# Patient Record
Sex: MALE | Race: WHITE | NOT HISPANIC OR LATINO | Employment: OTHER | URBAN - METROPOLITAN AREA
[De-identification: names, ages, dates, MRNs, and addresses within clinical notes are randomized per-mention and may not be internally consistent; named-entity substitution may affect disease eponyms.]

---

## 2017-01-30 ENCOUNTER — AMBULATORY - HEALTHEAST (OUTPATIENT)
Dept: LAB | Facility: CLINIC | Age: 57
End: 2017-01-30

## 2017-01-30 DIAGNOSIS — R97.20 ELEVATED PSA: ICD-10-CM

## 2017-01-30 DIAGNOSIS — E78.5 HYPERLIPIDEMIA: ICD-10-CM

## 2017-01-30 LAB
CHOLEST SERPL-MCNC: 292 MG/DL
FASTING STATUS PATIENT QL REPORTED: YES
HDLC SERPL-MCNC: 38 MG/DL
LDLC SERPL CALC-MCNC: 192 MG/DL
PSA SERPL-MCNC: 5.1 NG/ML (ref 0–3.5)
TRIGL SERPL-MCNC: 312 MG/DL

## 2017-01-31 ENCOUNTER — COMMUNICATION - HEALTHEAST (OUTPATIENT)
Dept: FAMILY MEDICINE | Facility: CLINIC | Age: 57
End: 2017-01-31

## 2017-03-06 ENCOUNTER — COMMUNICATION - HEALTHEAST (OUTPATIENT)
Dept: FAMILY MEDICINE | Facility: CLINIC | Age: 57
End: 2017-03-06

## 2017-03-06 ENCOUNTER — AMBULATORY - HEALTHEAST (OUTPATIENT)
Dept: FAMILY MEDICINE | Facility: CLINIC | Age: 57
End: 2017-03-06

## 2017-11-30 ENCOUNTER — COMMUNICATION - HEALTHEAST (OUTPATIENT)
Dept: FAMILY MEDICINE | Facility: CLINIC | Age: 57
End: 2017-11-30

## 2017-11-30 DIAGNOSIS — E78.00 HYPERCHOLESTEROLEMIA: ICD-10-CM

## 2017-11-30 DIAGNOSIS — R97.20 ELEVATED PSA: ICD-10-CM

## 2017-12-04 ENCOUNTER — COMMUNICATION - HEALTHEAST (OUTPATIENT)
Dept: INTERNAL MEDICINE | Facility: CLINIC | Age: 57
End: 2017-12-04

## 2017-12-04 ENCOUNTER — AMBULATORY - HEALTHEAST (OUTPATIENT)
Dept: LAB | Facility: CLINIC | Age: 57
End: 2017-12-04

## 2017-12-04 DIAGNOSIS — E78.00 HYPERCHOLESTEROLEMIA: ICD-10-CM

## 2017-12-04 DIAGNOSIS — R97.20 ELEVATED PSA: ICD-10-CM

## 2017-12-04 LAB
CHOLEST SERPL-MCNC: 253 MG/DL
FASTING STATUS PATIENT QL REPORTED: YES
HDLC SERPL-MCNC: 38 MG/DL
LDLC SERPL CALC-MCNC: 177 MG/DL
PSA SERPL-MCNC: 5.5 NG/ML (ref 0–3.5)
TRIGL SERPL-MCNC: 189 MG/DL

## 2017-12-08 ENCOUNTER — OFFICE VISIT - HEALTHEAST (OUTPATIENT)
Dept: INTERNAL MEDICINE | Facility: CLINIC | Age: 57
End: 2017-12-08

## 2017-12-08 DIAGNOSIS — Z78.9 PATIENT TRAVELS: ICD-10-CM

## 2017-12-08 DIAGNOSIS — R97.20 ELEVATED PSA: ICD-10-CM

## 2017-12-08 DIAGNOSIS — Z85.828 HISTORY OF SKIN CANCER: ICD-10-CM

## 2017-12-08 DIAGNOSIS — E78.00 HYPERCHOLESTEROLEMIA: ICD-10-CM

## 2017-12-08 DIAGNOSIS — Z12.11 SCREEN FOR COLON CANCER: ICD-10-CM

## 2017-12-08 ASSESSMENT — MIFFLIN-ST. JEOR: SCORE: 1700.09

## 2018-02-13 ENCOUNTER — RECORDS - HEALTHEAST (OUTPATIENT)
Dept: ADMINISTRATIVE | Facility: OTHER | Age: 58
End: 2018-02-13

## 2018-05-17 ENCOUNTER — OFFICE VISIT - HEALTHEAST (OUTPATIENT)
Dept: FAMILY MEDICINE | Facility: CLINIC | Age: 58
End: 2018-05-17

## 2018-05-17 ENCOUNTER — COMMUNICATION - HEALTHEAST (OUTPATIENT)
Dept: SCHEDULING | Facility: CLINIC | Age: 58
End: 2018-05-17
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
## 2018-05-18 LAB — B BURGDOR IGG+IGM SER QL: 0.17 INDEX VALUE

## 2018-05-23 ENCOUNTER — COMMUNICATION - HEALTHEAST (OUTPATIENT)
Dept: FAMILY MEDICINE | Facility: CLINIC | Age: 58
End: 2018-05-23

## 2018-05-28 ENCOUNTER — OFFICE VISIT - HEALTHEAST (OUTPATIENT)
Dept: FAMILY MEDICINE | Facility: CLINIC | Age: 58
End: 2018-05-28

## 2018-05-28 DIAGNOSIS — H11.31 SUBCONJUNCTIVAL HEMORRHAGE, RIGHT: ICD-10-CM

## 2019-02-05 ENCOUNTER — OFFICE VISIT - HEALTHEAST (OUTPATIENT)
Dept: FAMILY MEDICINE | Facility: CLINIC | Age: 59
End: 2019-02-05

## 2019-02-05 DIAGNOSIS — G89.29 CHRONIC LOW BACK PAIN WITH SCIATICA, SCIATICA LATERALITY UNSPECIFIED, UNSPECIFIED BACK PAIN LATERALITY: ICD-10-CM

## 2019-02-05 DIAGNOSIS — E78.49 OTHER HYPERLIPIDEMIA: ICD-10-CM

## 2019-02-05 DIAGNOSIS — M54.40 CHRONIC LOW BACK PAIN WITH SCIATICA, SCIATICA LATERALITY UNSPECIFIED, UNSPECIFIED BACK PAIN LATERALITY: ICD-10-CM

## 2019-02-05 DIAGNOSIS — N40.1 BENIGN PROSTATIC HYPERPLASIA WITH NOCTURIA: ICD-10-CM

## 2019-02-05 DIAGNOSIS — G47.33 OBSTRUCTIVE SLEEP APNEA (ADULT) (PEDIATRIC): ICD-10-CM

## 2019-02-05 DIAGNOSIS — Z00.00 PHYSICAL EXAM: ICD-10-CM

## 2019-02-05 DIAGNOSIS — D12.6 BENIGN NEOPLASM OF COLON, UNSPECIFIED PART OF COLON: ICD-10-CM

## 2019-02-05 DIAGNOSIS — R97.20 ELEVATED PSA: ICD-10-CM

## 2019-02-05 DIAGNOSIS — C44.309 MALIGNANT NEOPLASM OF SKIN OF PARTS OF FACE: ICD-10-CM

## 2019-02-05 DIAGNOSIS — R35.1 BENIGN PROSTATIC HYPERPLASIA WITH NOCTURIA: ICD-10-CM

## 2019-02-05 ASSESSMENT — MIFFLIN-ST. JEOR: SCORE: 1680.36

## 2019-02-22 ENCOUNTER — AMBULATORY - HEALTHEAST (OUTPATIENT)
Dept: LAB | Facility: CLINIC | Age: 59
End: 2019-02-22

## 2019-02-22 ENCOUNTER — COMMUNICATION - HEALTHEAST (OUTPATIENT)
Dept: PEDIATRICS | Facility: CLINIC | Age: 59
End: 2019-02-22

## 2019-02-22 DIAGNOSIS — R97.20 ELEVATED PSA: ICD-10-CM

## 2019-02-22 DIAGNOSIS — E78.49 OTHER HYPERLIPIDEMIA: ICD-10-CM

## 2019-02-22 LAB
ALBUMIN SERPL-MCNC: 4.1 G/DL (ref 3.5–5)
ALP SERPL-CCNC: 75 U/L (ref 45–120)
ALT SERPL W P-5'-P-CCNC: 27 U/L (ref 0–45)
ANION GAP SERPL CALCULATED.3IONS-SCNC: 9 MMOL/L (ref 5–18)
AST SERPL W P-5'-P-CCNC: 23 U/L (ref 0–40)
BILIRUB SERPL-MCNC: 0.6 MG/DL (ref 0–1)
BUN SERPL-MCNC: 9 MG/DL (ref 8–22)
CALCIUM SERPL-MCNC: 9.5 MG/DL (ref 8.5–10.5)
CHLORIDE BLD-SCNC: 107 MMOL/L (ref 98–107)
CHOLEST SERPL-MCNC: 231 MG/DL
CO2 SERPL-SCNC: 26 MMOL/L (ref 22–31)
CREAT SERPL-MCNC: 1.02 MG/DL (ref 0.7–1.3)
FASTING STATUS PATIENT QL REPORTED: YES
GFR SERPL CREATININE-BSD FRML MDRD: >60 ML/MIN/1.73M2
GLUCOSE BLD-MCNC: 94 MG/DL (ref 70–125)
HDLC SERPL-MCNC: 43 MG/DL
LDLC SERPL CALC-MCNC: 142 MG/DL
POTASSIUM BLD-SCNC: 4.6 MMOL/L (ref 3.5–5)
PROT SERPL-MCNC: 7 G/DL (ref 6–8)
PSA SERPL-MCNC: 4.9 NG/ML (ref 0–3.5)
SODIUM SERPL-SCNC: 142 MMOL/L (ref 136–145)
TRIGL SERPL-MCNC: 231 MG/DL

## 2019-05-08 ENCOUNTER — OFFICE VISIT - HEALTHEAST (OUTPATIENT)
Dept: INTERNAL MEDICINE | Facility: CLINIC | Age: 59
End: 2019-05-08

## 2019-05-08 DIAGNOSIS — G47.30 SLEEP APNEA, UNSPECIFIED TYPE: ICD-10-CM

## 2019-05-08 DIAGNOSIS — R53.83 FATIGUE, UNSPECIFIED TYPE: ICD-10-CM

## 2019-10-11 ENCOUNTER — OFFICE VISIT - HEALTHEAST (OUTPATIENT)
Dept: SLEEP MEDICINE | Facility: CLINIC | Age: 59
End: 2019-10-11

## 2019-10-11 DIAGNOSIS — G47.10 EXCESSIVE SLEEPINESS: ICD-10-CM

## 2019-10-11 DIAGNOSIS — G47.33 OBSTRUCTIVE SLEEP APNEA (ADULT) (PEDIATRIC): ICD-10-CM

## 2019-10-11 RX ORDER — LORATADINE 10 MG/1
10 TABLET ORAL DAILY
Status: SHIPPED | COMMUNITY
Start: 2019-10-11 | End: 2022-05-25

## 2019-10-11 ASSESSMENT — MIFFLIN-ST. JEOR: SCORE: 1708.94

## 2019-12-05 ENCOUNTER — COMMUNICATION - HEALTHEAST (OUTPATIENT)
Dept: TELEHEALTH | Facility: CLINIC | Age: 59
End: 2019-12-05

## 2019-12-05 ENCOUNTER — OFFICE VISIT - HEALTHEAST (OUTPATIENT)
Dept: FAMILY MEDICINE | Facility: CLINIC | Age: 59
End: 2019-12-05

## 2019-12-05 DIAGNOSIS — R05.9 COUGH: ICD-10-CM

## 2019-12-05 RX ORDER — ALBUTEROL SULFATE 90 UG/1
2 AEROSOL, METERED RESPIRATORY (INHALATION) EVERY 6 HOURS PRN
Status: SHIPPED | COMMUNITY
Start: 2019-12-05 | End: 2022-03-29

## 2019-12-05 ASSESSMENT — MIFFLIN-ST. JEOR: SCORE: 1679.91

## 2020-04-15 ENCOUNTER — OFFICE VISIT - HEALTHEAST (OUTPATIENT)
Dept: FAMILY MEDICINE | Facility: CLINIC | Age: 60
End: 2020-04-15

## 2020-04-15 DIAGNOSIS — R35.1 NOCTURIA: ICD-10-CM

## 2020-05-11 ENCOUNTER — COMMUNICATION - HEALTHEAST (OUTPATIENT)
Dept: FAMILY MEDICINE | Facility: CLINIC | Age: 60
End: 2020-05-11

## 2020-05-11 DIAGNOSIS — R35.1 NOCTURIA: ICD-10-CM

## 2020-06-09 ENCOUNTER — COMMUNICATION - HEALTHEAST (OUTPATIENT)
Dept: FAMILY MEDICINE | Facility: CLINIC | Age: 60
End: 2020-06-09

## 2020-06-09 DIAGNOSIS — R35.1 NOCTURIA: ICD-10-CM

## 2020-07-11 ENCOUNTER — COMMUNICATION - HEALTHEAST (OUTPATIENT)
Dept: FAMILY MEDICINE | Facility: CLINIC | Age: 60
End: 2020-07-11

## 2020-07-11 DIAGNOSIS — R35.1 NOCTURIA: ICD-10-CM

## 2020-07-19 ENCOUNTER — VIRTUAL VISIT (OUTPATIENT)
Dept: FAMILY MEDICINE | Facility: OTHER | Age: 60
End: 2020-07-19

## 2020-07-20 ENCOUNTER — COMMUNICATION - HEALTHEAST (OUTPATIENT)
Dept: SCHEDULING | Facility: CLINIC | Age: 60
End: 2020-07-20

## 2020-07-20 ENCOUNTER — AMBULATORY - HEALTHEAST (OUTPATIENT)
Dept: FAMILY MEDICINE | Facility: CLINIC | Age: 60
End: 2020-07-20

## 2020-07-20 ENCOUNTER — OFFICE VISIT - HEALTHEAST (OUTPATIENT)
Dept: FAMILY MEDICINE | Facility: CLINIC | Age: 60
End: 2020-07-20
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
## 2020-07-20 DIAGNOSIS — Z20.822 SUSPECTED COVID-19 VIRUS INFECTION: ICD-10-CM

## 2020-07-20 NOTE — PROGRESS NOTES
"Date: 2020 19:13:57  Clinician: Donald Amezquita  Clinician NPI: [id number]  Patient: Jermaine Hoyos  Patient : 1960  Patient Address: [address] [place] [address]  Patient Phone: [phone]  Visit Protocol: URI  Patient Summary:  Jermaine is a 60 year old ( : 1960 ) male who initiated a Visit for COVID-19 (Coronavirus) evaluation and screening. When asked the question \"Please sign me up to receive news, health information and promotions. \", Jermaine responded \"No\".    Jermaine states his symptoms started 1-2 days ago.   His symptoms consist of tooth pain, malaise, a headache, chills, a sore throat, and myalgia. Jermaine also feels feverish.   Symptom details     Sore throat: Jermaine reports having mild throat pain (1-3 on a 10 point pain scale), does not have exudate on his tonsils, and can swallow liquids. He is not sure if the lymph nodes in his neck are enlarged. A rash has not appeared on the skin since the sore throat started.     Temperature: His current temperature is 101.0 degrees Fahrenheit. Jermaine has had a temperature over 100 degrees Fahrenheit for 1-2 days.     Headache: He states the headache is moderate (4-6 on a 10 point pain scale).     Tooth pain: The tooth pain is not caused by a cavity, recent dental work, or other mouth problems.      Jermaine denies having wheezing, nausea, ageusia, diarrhea, vomiting, rhinitis, ear pain, anosmia, facial pain or pressure, cough, and nasal congestion. He also denies having recent facial or sinus surgery in the past 60 days and taking antibiotic medication in the past month. He is not experiencing dyspnea.   Precipitating events  Within the past week, Jermaine has not been exposed to someone with strep throat. He has not recently been exposed to someone with influenza. Jermaine has not been in close contact with any high risk individuals.   Pertinent COVID-19 (Coronavirus) information  In the past 14 days, Jermaine has not " worked in a congregate living setting.   He does not work or volunteer as healthcare worker or a  and does not work or volunteer in a healthcare facility.   Jermaine also has not lived in a congregate living setting in the past 14 days. He lives with a healthcare worker.   Jermaine has not had a close contact with a laboratory-confirmed COVID-19 patient within 14 days of symptom onset.   Pertinent medical history  Jermaine had 2 sinus infections within the past year.   Jermaine needs a return to work/school note.   Weight: 183 lbs   Jermaine does not smoke or use smokeless tobacco.   Additional information as reported by the patient (free text): Aches started sat 18 July headache started sat 2 pm. Fever started sat July 18 2 pm 99.5. 100 to 100.5 thru nite. 101.5 Sunday derrick.   Weight: 183 lbs    MEDICATIONS: Lipitor oral, Crestor oral, Aleve oral, ALLERGIES: Lipitor, Crestor, Aleve  Clinician Response:  Dear Jermaine,   Your symptoms show that you may have coronavirus (COVID-19). This illness can cause fever, cough and trouble breathing. Many people get a mild case and get better on their own. Some people can get very sick.  What should I do?  We would like to test you for this virus.   1. Please call [phone] to schedule your visit. Explain that you were referred by [facility] to have a COVID-19 test. Be ready to share your [facility] visit ID number.  The following will serve as your written order for this COVID Test, ordered by me, for the indication of suspected COVID [Z20.828]: The test will be ordered in [employer], our electronic health record, after you are scheduled. It will show as ordered and authorized by Stevie Alexis MD.  Order: COVID-19 (Coronavirus) PCR for SYMPTOMATIC testing from [facility].      2. When it's time for your COVID test:  Stay at least 6 feet away from others. (If someone will drive you to your test, stay in the backseat, as far away from the  as you can.)   Cover your mouth and nose  "with a mask, tissue or washcloth.  Go straight to the testing site. Don't make any stops on the way there or back.      3.Starting now: Stay home and away from others (self-isolate) until:   You've had no fever---and no medicine that reduces fever---for 3 full days (72 hours). And...   Your other symptoms have gotten better. For example, your cough or breathing has improved. And...   At least 10 days have passed since your symptoms started.       During this time, don't leave the house except for testing or medical care.   Stay in your own room, even for meals. Use your own bathroom if you can.   Stay away from others in your home. No hugging, kissing or shaking hands. No visitors.  Don't go to work, school or anywhere else.    Clean \"high touch\" surfaces often (doorknobs, counters, handles, etc.). Use a household cleaning spray or wipes. You'll find a full list of  on the EPA website: www.epa.gov/pesticide-registration/list-n-disinfectants-use-against-sars-cov-2.   Cover your mouth and nose with a mask, tissue or washcloth to avoid spreading germs.  Wash your hands and face often. Use soap and water.  Caregivers in these groups are at risk for severe illness due to COVID-19:  o People 65 years and older  o People who live in a nursing home or long-term care facility  o People with chronic disease (lung, heart, cancer, diabetes, kidney, liver, immunologic)  o People who have a weakened immune system, including those who:   Are in cancer treatment  Take medicine that weakens the immune system, such as corticosteroids  Had a bone marrow or organ transplant  Have an immune deficiency  Have poorly controlled HIV or AIDS  Are obese (body mass index of 40 or higher)  Smoke regularly   o Caregivers should wear gloves while washing dishes, handling laundry and cleaning bedrooms and bathrooms.  o Use caution when washing and drying laundry: Don't shake dirty laundry, and use the warmest water setting that you can.  o " For more tips, go to www.cdc.gov/coronavirus/2019-ncov/downloads/10Things.pdf.    4.Sign up for [employer]. We know it's scary to hear that you might have COVID-19. We want to track your symptoms to make sure you're okay over the next 2 weeks. Please look for an email from [employer]---this is a free, online program that we'll use to keep in touch. To sign up, follow the link in the email. Learn more at [email or web address]  How can I take care of myself?   Get lots of rest. Drink extra fluids (unless a doctor has told you not to).   Take Tylenol (acetaminophen) for fever or pain. If you have liver or kidney problems, ask your family doctor if it's okay to take Tylenol.   Adults can take either:    650 mg (two 325 mg pills) every 4 to 6 hours, or...   1,000 mg (two 500 mg pills) every 8 hours as needed.    Note: Don't take more than 3,000 mg in one day. Acetaminophen is found in many medicines (both prescribed and over-the-counter medicines). Read all labels to be sure you don't take too much.   For children, check the Tylenol bottle for the right dose. The dose is based on the child's age or weight.    If you have other health problems (like cancer, heart failure, an organ transplant or severe kidney disease): Call your specialty clinic if you don't feel better in the next 2 days.       Know when to call 911. Emergency warning signs include:    Trouble breathing or shortness of breath Pain or pressure in the chest that doesn't go away Feeling confused like you haven't felt before, or not being able to wake up Bluish-colored lips or face.  Where can I get more information?    [facility] -- About COVID-19: [email or web address]   CDC -- What to Do If You're Sick: www.cdc.gov/coronavirus/2019-ncov/about/steps-when-sick.html   CDC -- Ending Home Isolation: www.cdc.gov/coronavirus/2019-ncov/hcp/disposition-in-home-patients.html   CDC -- Caring for Someone:  www.cdc.gov/coronavirus/2019-ncov/if-you-are-sick/care-for-someone.html   [facility] -- Interim Guidance for Hospital Discharge to Home: www.health.[facility].[place]/diseases/coronavirus/hcp/hospdischarge.pdf   [facility] clinical trials (COVID-19 research studies): clinicalaffairs.[facility]-clinical-trials    Below are the COVID-19 hotlines at the [place] Department of Health ([facility]). Interpreters are available.    For health questions: Call 885-634-7190 or 1-852.540.7655 (7 a.m. to 7 p.m.) For questions about schools and childcare: Call 119-150-4603 or 1-440.718.4287 (7 a.m. to 7 p.m.)    Diagnosis: Other malaise  Diagnosis ICD: R53.81

## 2020-07-21 ENCOUNTER — AMBULATORY - HEALTHEAST (OUTPATIENT)
Dept: LAB | Facility: CLINIC | Age: 60
End: 2020-07-21

## 2020-07-21 ENCOUNTER — AMBULATORY - HEALTHEAST (OUTPATIENT)
Dept: FAMILY MEDICINE | Facility: CLINIC | Age: 60
End: 2020-07-21

## 2020-07-21 ENCOUNTER — COMMUNICATION - HEALTHEAST (OUTPATIENT)
Dept: INTERNAL MEDICINE | Facility: CLINIC | Age: 60
End: 2020-07-21
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
## 2020-07-21 DIAGNOSIS — Z20.822 SUSPECTED COVID-19 VIRUS INFECTION: ICD-10-CM

## 2020-07-22 ENCOUNTER — COMMUNICATION - HEALTHEAST (OUTPATIENT)
Dept: FAMILY MEDICINE | Facility: CLINIC | Age: 60
End: 2020-07-22

## 2020-07-22 LAB — B BURGDOR IGG+IGM SER QL: 0.45 INDEX VALUE

## 2020-07-24 ENCOUNTER — COMMUNICATION - HEALTHEAST (OUTPATIENT)
Dept: FAMILY MEDICINE | Facility: CLINIC | Age: 60
End: 2020-07-24

## 2020-08-14 ENCOUNTER — NURSE TRIAGE (OUTPATIENT)
Dept: NURSING | Facility: CLINIC | Age: 60
End: 2020-08-14

## 2020-08-14 ENCOUNTER — COMMUNICATION - HEALTHEAST (OUTPATIENT)
Dept: SCHEDULING | Facility: CLINIC | Age: 60
End: 2020-08-14

## 2020-08-14 ENCOUNTER — AMBULATORY - HEALTHEAST (OUTPATIENT)
Dept: FAMILY MEDICINE | Facility: CLINIC | Age: 60
End: 2020-08-14

## 2020-08-14 DIAGNOSIS — Z78.9 PATIENT TRAVELS: ICD-10-CM

## 2020-08-14 RX ORDER — ACETAZOLAMIDE 125 MG/1
TABLET ORAL
Qty: 20 TABLET | Refills: 0 | Status: SHIPPED | OUTPATIENT
Start: 2020-08-14 | End: 2022-03-29

## 2020-09-09 ENCOUNTER — OFFICE VISIT - HEALTHEAST (OUTPATIENT)
Dept: INTERNAL MEDICINE | Facility: CLINIC | Age: 60
End: 2020-09-09

## 2020-09-09 DIAGNOSIS — D12.6 BENIGN NEOPLASM OF COLON, UNSPECIFIED PART OF COLON: ICD-10-CM

## 2020-09-09 DIAGNOSIS — J30.1 SEASONAL ALLERGIC RHINITIS DUE TO POLLEN: ICD-10-CM

## 2020-09-09 DIAGNOSIS — Z12.5 SCREENING PSA (PROSTATE SPECIFIC ANTIGEN): ICD-10-CM

## 2020-09-09 DIAGNOSIS — E78.49 OTHER HYPERLIPIDEMIA: ICD-10-CM

## 2020-09-09 DIAGNOSIS — G89.29 CHRONIC LOW BACK PAIN WITH SCIATICA, SCIATICA LATERALITY UNSPECIFIED, UNSPECIFIED BACK PAIN LATERALITY: ICD-10-CM

## 2020-09-09 DIAGNOSIS — Z86.19 HISTORY OF LYME DISEASE: ICD-10-CM

## 2020-09-09 DIAGNOSIS — M54.40 CHRONIC LOW BACK PAIN WITH SCIATICA, SCIATICA LATERALITY UNSPECIFIED, UNSPECIFIED BACK PAIN LATERALITY: ICD-10-CM

## 2020-09-09 DIAGNOSIS — Z29.89 ALTITUDE SICKNESS PREVENTATIVE MEASURES: ICD-10-CM

## 2020-09-09 DIAGNOSIS — G47.33 OBSTRUCTIVE SLEEP APNEA (ADULT) (PEDIATRIC): ICD-10-CM

## 2020-09-09 DIAGNOSIS — Z00.00 ROUTINE GENERAL MEDICAL EXAMINATION AT A HEALTH CARE FACILITY: ICD-10-CM

## 2020-09-09 DIAGNOSIS — N40.1 BENIGN PROSTATIC HYPERPLASIA WITH NOCTURIA: ICD-10-CM

## 2020-09-09 DIAGNOSIS — R35.1 BENIGN PROSTATIC HYPERPLASIA WITH NOCTURIA: ICD-10-CM

## 2020-09-09 DIAGNOSIS — R97.20 ELEVATED PSA: ICD-10-CM

## 2020-09-09 ASSESSMENT — MIFFLIN-ST. JEOR: SCORE: 1674.81

## 2020-09-16 ENCOUNTER — AMBULATORY - HEALTHEAST (OUTPATIENT)
Dept: LAB | Facility: CLINIC | Age: 60
End: 2020-09-16

## 2020-09-16 DIAGNOSIS — Z00.00 ROUTINE GENERAL MEDICAL EXAMINATION AT A HEALTH CARE FACILITY: ICD-10-CM

## 2020-09-16 DIAGNOSIS — Z86.19 HISTORY OF LYME DISEASE: ICD-10-CM

## 2020-09-16 DIAGNOSIS — E78.49 OTHER HYPERLIPIDEMIA: ICD-10-CM

## 2020-09-16 DIAGNOSIS — Z12.5 SCREENING PSA (PROSTATE SPECIFIC ANTIGEN): ICD-10-CM

## 2020-09-16 LAB
ALBUMIN SERPL-MCNC: 4 G/DL (ref 3.5–5)
ALP SERPL-CCNC: 80 U/L (ref 45–120)
ALT SERPL W P-5'-P-CCNC: 19 U/L (ref 0–45)
ANION GAP SERPL CALCULATED.3IONS-SCNC: 9 MMOL/L (ref 5–18)
AST SERPL W P-5'-P-CCNC: 19 U/L (ref 0–40)
BILIRUB SERPL-MCNC: 0.6 MG/DL (ref 0–1)
BUN SERPL-MCNC: 11 MG/DL (ref 8–22)
CALCIUM SERPL-MCNC: 9.3 MG/DL (ref 8.5–10.5)
CHLORIDE BLD-SCNC: 106 MMOL/L (ref 98–107)
CHOLEST SERPL-MCNC: 237 MG/DL
CO2 SERPL-SCNC: 24 MMOL/L (ref 22–31)
CREAT SERPL-MCNC: 1 MG/DL (ref 0.7–1.3)
ERYTHROCYTE [DISTWIDTH] IN BLOOD BY AUTOMATED COUNT: 12 % (ref 11–14.5)
FASTING STATUS PATIENT QL REPORTED: YES
GFR SERPL CREATININE-BSD FRML MDRD: >60 ML/MIN/1.73M2
GLUCOSE BLD-MCNC: 94 MG/DL (ref 70–125)
HCT VFR BLD AUTO: 50.7 % (ref 40–54)
HDLC SERPL-MCNC: 39 MG/DL
HGB BLD-MCNC: 16.8 G/DL (ref 14–18)
LDLC SERPL CALC-MCNC: 133 MG/DL
MCH RBC QN AUTO: 31 PG (ref 27–34)
MCHC RBC AUTO-ENTMCNC: 33.2 G/DL (ref 32–36)
MCV RBC AUTO: 94 FL (ref 80–100)
PLATELET # BLD AUTO: 224 THOU/UL (ref 140–440)
PMV BLD AUTO: 8.1 FL (ref 7–10)
POTASSIUM BLD-SCNC: 4.8 MMOL/L (ref 3.5–5)
PROT SERPL-MCNC: 6.9 G/DL (ref 6–8)
PSA SERPL-MCNC: 4.3 NG/ML (ref 0–3.5)
RBC # BLD AUTO: 5.42 MILL/UL (ref 4.4–6.2)
SODIUM SERPL-SCNC: 139 MMOL/L (ref 136–145)
TRIGL SERPL-MCNC: 324 MG/DL
WBC: 4.6 THOU/UL (ref 4–11)

## 2020-09-17 LAB — B BURGDOR IGG+IGM SER QL: 1.62 INDEX VALUE

## 2020-09-21 ENCOUNTER — COMMUNICATION - HEALTHEAST (OUTPATIENT)
Dept: INTERNAL MEDICINE | Facility: CLINIC | Age: 60
End: 2020-09-21

## 2020-09-25 ENCOUNTER — COMMUNICATION - HEALTHEAST (OUTPATIENT)
Dept: INTERNAL MEDICINE | Facility: CLINIC | Age: 60
End: 2020-09-25

## 2020-09-25 LAB
B BURGDOR AB SER-IMP: ABNORMAL
LYME AB IGG BAND(S): ABNORMAL
LYME AB IGM BAND(S): ABNORMAL
LYME IGG BLOT: NEGATIVE
LYME IGM BLOT: POSITIVE

## 2020-10-03 ENCOUNTER — COMMUNICATION - HEALTHEAST (OUTPATIENT)
Dept: INTERNAL MEDICINE | Facility: CLINIC | Age: 60
End: 2020-10-03

## 2020-10-03 DIAGNOSIS — R35.1 BENIGN PROSTATIC HYPERPLASIA WITH NOCTURIA: ICD-10-CM

## 2020-10-03 DIAGNOSIS — N40.1 BENIGN PROSTATIC HYPERPLASIA WITH NOCTURIA: ICD-10-CM

## 2020-11-30 ENCOUNTER — COMMUNICATION - HEALTHEAST (OUTPATIENT)
Dept: INTERNAL MEDICINE | Facility: CLINIC | Age: 60
End: 2020-11-30

## 2020-11-30 DIAGNOSIS — I25.10 CORONARY ARTERY CALCIFICATION SEEN ON CT SCAN: ICD-10-CM

## 2020-11-30 DIAGNOSIS — E78.49 OTHER HYPERLIPIDEMIA: ICD-10-CM

## 2020-11-30 RX ORDER — PRAVASTATIN SODIUM 20 MG
20 TABLET ORAL AT BEDTIME
Qty: 90 TABLET | Refills: 3 | Status: SHIPPED | OUTPATIENT
Start: 2020-11-30 | End: 2021-12-03 | Stop reason: SINTOL

## 2020-12-09 ENCOUNTER — OFFICE VISIT - HEALTHEAST (OUTPATIENT)
Dept: INTERNAL MEDICINE | Facility: CLINIC | Age: 60
End: 2020-12-09

## 2020-12-09 DIAGNOSIS — E78.49 OTHER HYPERLIPIDEMIA: ICD-10-CM

## 2020-12-09 DIAGNOSIS — I25.10 CORONARY ARTERY CALCIFICATION SEEN ON CT SCAN: ICD-10-CM

## 2020-12-09 RX ORDER — ICOSAPENT ETHYL 1 G/1
2 CAPSULE ORAL
Status: SHIPPED | COMMUNITY
Start: 2020-11-20 | End: 2022-09-20

## 2021-01-05 ENCOUNTER — COMMUNICATION - HEALTHEAST (OUTPATIENT)
Dept: INTERNAL MEDICINE | Facility: CLINIC | Age: 61
End: 2021-01-05

## 2021-01-05 DIAGNOSIS — R35.1 BENIGN PROSTATIC HYPERPLASIA WITH NOCTURIA: ICD-10-CM

## 2021-01-05 DIAGNOSIS — N40.1 BENIGN PROSTATIC HYPERPLASIA WITH NOCTURIA: ICD-10-CM

## 2021-01-05 RX ORDER — FINASTERIDE 5 MG/1
5 TABLET, FILM COATED ORAL DAILY
Qty: 90 TABLET | Refills: 2 | Status: SHIPPED | OUTPATIENT
Start: 2021-01-05 | End: 2021-10-31

## 2021-04-08 ENCOUNTER — COMMUNICATION - HEALTHEAST (OUTPATIENT)
Dept: INTERNAL MEDICINE | Facility: CLINIC | Age: 61
End: 2021-04-08

## 2021-05-22 ENCOUNTER — HEALTH MAINTENANCE LETTER (OUTPATIENT)
Age: 61
End: 2021-05-22

## 2021-05-28 ENCOUNTER — RECORDS - HEALTHEAST (OUTPATIENT)
Dept: ADMINISTRATIVE | Facility: CLINIC | Age: 61
End: 2021-05-28

## 2021-05-28 NOTE — PATIENT INSTRUCTIONS - HE
See  for referral to sleep clinic.    Reduce or stop drinking alcohol, which can make sleep apnea worse.    Plan follow-up next winter for your yearly physical.

## 2021-05-28 NOTE — PROGRESS NOTES
[facility] clinic Follow Up Note    Jermaine Hoyos   59 y.o. male    Date of Visit: 5/8/2019    Chief Complaint   Patient presents with     Sleep apnea     Sleeping on his side no longer relieving effects of sleep apnea. Referral request to sleep medicine.     Sanya Platt is here for referral to the sleep clinic to get evaluated for a new CPAP.    He was diagnosed with sleep apnea over 10 years ago.  He was given a CPAP machine, but had difficulty tolerating it as he is a mouth breather.  He did not feel much benefit from it a number of years ago and stopped using.  He was sleeping on his side which was largely working for him.    Over the past year he has had increasing daytime fatigue and sleepiness.  His wife is noticing that he is having increasing apnea spells at night and worsening snoring.    If he sleeps on the couch on his back he will wake up gasping for air at times.    He does drink 1-2 alcohol drinks in the evening intermittently.    No palpitations or history of arrhythmia.    He has had a normal blood pressure.    No increasing shortness of breath or lower extremity edema.    No worsening myalgias or fiber myalgia type symptoms.    Cholesterol is well controlled in February on simvastatin.    He is never smoked.  No worsening shortness of breath.        PMHx:  No past medical history on file.  PSHx:  No past surgical history on file.  Immunizations:   Immunization History   Administered Date(s) Administered     Hep A / Hep B 12/17/2008, 01/15/2009, 06/25/2009     Influenza T5y5-72, 02/02/2010     Influenza, inj, historic,unspecified 09/22/2009, 10/10/2010, 10/01/2011, 10/01/2012     Influenza, seasonal,quad inj 36+ mos 09/19/2016     Td,adult,historic,unspecified 03/27/1995, 03/13/2008     Tdap 02/04/2013       ROS A comprehensive review of systems was performed and was otherwise negative    Medications, allergies, and problem list were reviewed and updated    Exam  /74  (Patient Site: Right Arm, Patient Position: Sitting, Cuff Size: Adult Regular)   Pulse 72   Resp 14   Wt 187 lb 14.4 oz (85.2 kg)   SpO2 98%   BMI 25.48 kg/m    His pharynx does not appear to be significantly crowded.  No significant neck adiposity.  Lungs are clear.  Heart is regular without murmur.  No edema.  Just mildly overweight.    Assessment/Plan  1. Sleep apnea, unspecified type  Increasing daytime fatigue with previous diagnosis of sleep apnea, witnessed apnea by his wife.    I suspect his sleep apnea is worsening.  I did  patient to reduce or stop alcohol.    He is not significantly obese.    Refer to sleep clinic for sleep evaluation and a new CPAP that would be more functional for him.  He is having problems with mouth breathing.  - Ambulatory referral to Sleep Medicine    2. Fatigue, unspecified type  As above  - Ambulatory referral to Sleep Medicine    Appears to be tolerating the simvastatin.  Labs reviewed from February 2019.  I did review the physical exam note from February 2019 with Dr. Morales.    History of elevated PSA but that was stable February 2019 PSA 4.9.  December 2017 PSA 5.0.    Follow-up next year for physical exam as planned.    Return in about 9 months (around 2/8/2020) for Annual physical.   Patient Instructions   See  for referral to sleep clinic.    Reduce or stop drinking alcohol, which can make sleep apnea worse.    Plan follow-up next winter for your yearly physical.    Nehemiah Otto MD    Current Outpatient Medications   Medication Sig Dispense Refill     simvastatin (ZOCOR) 40 MG tablet Take 1 tablet (40 mg total) by mouth at bedtime. (Patient taking differently: Take 40 mg by mouth 4 (four) times a week.       ) 90 tablet 3     acetaZOLAMIDE (DIAMOX) 125 MG tablet Take 1 pill 3 times a day one day prior to ascent and continue while at higher altitude. 15 tablet 1     No current facility-administered medications for this visit.      Allergies   Allergen  Reactions     Atorvastatin Other (See Comments)     Memory loss, joint aches     Ibuprofen Itching     Palms, itching     Naproxen Sodium Itching     palms     Rosuvastatin Other (See Comments)     Memory loss, muscle aches     Tolmetin Itching     Itching palms and feet     Social History     Tobacco Use     Smoking status: Never Smoker     Smokeless tobacco: Never Used   Substance Use Topics     Alcohol use: Yes     Alcohol/week: 3.0 - 6.0 oz     Types: 5 - 10 Cans of beer per week     Drug use: No

## 2021-05-29 ENCOUNTER — RECORDS - HEALTHEAST (OUTPATIENT)
Dept: ADMINISTRATIVE | Facility: CLINIC | Age: 61
End: 2021-05-29

## 2021-05-31 VITALS — WEIGHT: 185 LBS | BODY MASS INDEX: 24.52 KG/M2 | HEIGHT: 73 IN

## 2021-06-01 VITALS — BODY MASS INDEX: 24.48 KG/M2 | WEIGHT: 183 LBS

## 2021-06-01 VITALS — BODY MASS INDEX: 25.82 KG/M2 | WEIGHT: 193 LBS

## 2021-06-02 VITALS — HEIGHT: 72 IN | BODY MASS INDEX: 24.71 KG/M2 | WEIGHT: 182.4 LBS

## 2021-06-02 VITALS — WEIGHT: 187.9 LBS | BODY MASS INDEX: 25.48 KG/M2

## 2021-06-02 NOTE — PROGRESS NOTES
Dear  Nehemiah Otto Md  [facility]  [address] [place] [address]    Thank you for the opportunity to participate in the care of  Jermaine Hoyos.    Jermaine Hoyos is sent by Nehemiah Otto MD for a sleep consultation regarding snoring and return of SHANNAN symptoms.     He has a history of BPH and SHANNAN.  Originally diagnosed with SHANNAN via [facility] in 2006 with AHI of 20.  Repeat testing with [facility] in 2009 showed AHI of 8.1 with supine dependent SHANNAN (supine AHI 19.2/hr vs non-supine of 0.5/hr).  Was told to sleep lateral which worked well until he developed pain issues.  Now can't sleep lateral and symptoms have obviously returned.  Having frequent snort arousals with witnessed apneas, loud snoring, and excessive sleepiness.    Schedule - Typically in bed around 10 PM and asleep instantly (reports he often falls asleep earlier and before getting in bed).  Up around 2 - 3 times per night. Up for the day between 4 - 6 AM and feels he gets about 6 hours of sleep per night.   Works as  at [employer].  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      Sleep Disordered Breathing - See above.     Has nocturia dependent on alcohol consumption.     Upon waking feels tired.  He denies morning headaches.  No morning dry mouth.  Seasonal morning sinus congestion.   Patient was counseled on the importance of driving while alert, to pull over if drowsy, or nap before getting into the vehicle if sleepy.    Movement/Behaviors - No somniloquy.  No somnambulism.  No sleep related eating.  No dream enactment behavior.   Patient denies typical restless legs syndrome symptoms.    Alcohol use - 1 - 2 drinks per day.  Caffeine intake - coffee 2 - 3 /day. Last caffeine intake is usually in the morning but may have 3rd cup around 2 PM.  Tobacco exposure - none    Lives with wife. Kids are in college.  Has 2  pets, dogs.     Has family history of snoring in father, without formal diagnosis of Obstructive Sleep Apnea.    Past Medical History:  No past medical history on file.    Problem List:  Patient Active Problem List    Diagnosis Date Noted     BPH (benign prostatic hyperplasia) 02/05/2019     Overview Note:     Symptoms in 50's  Nocturia, frequency, urgency       Low back pain 02/05/2019     Overview Note:     Longstanding  With R sciatica  streching       Elevated PSA 09/19/2016     Overview Note:     Dx late 40's  Level 5.8       Squamous Cell Carcinoma Of The Skin Of The Face      Overview Note:     Dx around 2000  Left nasal area  Dermatology-         Obstructive Sleep Apnea      Overview Note:      Not on CPAP  Sleep on side         Benign Adenomatosis Of The Large Intestine      Overview Note:     Created by [facility] Annotation: Feb 19 2013 11:38AM -  ,  : colonoscipy done 2/14/13         Hyperlipidemia      Overview Note:     Dx 2013  Did not tolerate Crestor or atorvastatin  LDL to 192; Trig to 312; HDL to 38  Simvastatin-not taking               Past Surgical History:  No past surgical history on file.     Meds:  Current Outpatient Medications   Medication Sig Dispense Refill     loratadine (CLARITIN) 10 mg tablet Take 10 mg by mouth daily.       No current facility-administered medications for this visit.         Allergies:  Atorvastatin; Ibuprofen; Naproxen sodium; Rosuvastatin; and Tolmetin     Social History:  Social History     Socioeconomic History     Marital status:      Spouse name: Not on file     Number of children: 2     Years of education: Not on file     Highest education level: Not on file   Occupational History     Occupation:      Employer: [employer]   Social Needs     Financial resource strain: Not on file     Food insecurity:     Worry: Not on file     Inability: Not on file     Transportation needs:     Medical: Not on file     Non-medical: Not on file   Tobacco Use      Smoking status: Never Smoker     Smokeless tobacco: Never Used   Substance and Sexual Activity     Alcohol use: Yes     Alcohol/week: 5.0 - 10.0 standard drinks     Types: 5 - 10 Cans of beer per week     Drug use: No     Sexual activity: Not on file   Lifestyle     Physical activity:     Days per week: Not on file     Minutes per session: Not on file     Stress: Not on file   Relationships     Social connections:     Talks on phone: Not on file     Gets together: Not on file     Attends [employer]ism service: Not on file     Active member of club or organization: Not on file     Attends meetings of clubs or organizations: Not on file     Relationship status: Not on file     Intimate partner violence:     Fear of current or ex partner: Not on file     Emotionally abused: Not on file     Physically abused: Not on file     Forced sexual activity: Not on file   Other Topics Concern     Not on file   Social History Narrative    Diet- He has been trying a Mediterranean-like diet        Exercise- Walks his dog for 1 to 2 miles 4 days/ week. Walks at work 1 to 2 times a week.        Wife is an MD        Family History:  Family History   Problem Relation Age of Onset     Memory loss Mother 75     Hyperlipidemia Mother      Stroke Mother         Maybe     Benign prostatic hyperplasia Father         laser surgery     Transient ischemic attack Father         late 70's     Benign prostatic hyperplasia Maternal Grandfather         had surgery, not sure if CA        Review of Systems: -  A complete review of systems reviewed by me is negative with the exeption of what has been mentioned in the history of present illness.    Physical Exam:  /82 (Patient Site: Right Arm, Patient Position: Sitting, Cuff Size: Adult Regular)   Pulse 78   Ht 6' (1.829 m)   Wt 188 lb 11.2 oz (85.6 kg)   SpO2 97%   BMI 25.59 kg/m    General appearance: No apparent distress, well groomed.    HEENT:   Head: Normocephalic, atraumatic.  Eyes:  PERRL  Nose: Nares patent.  No exudate.  No septal deviation noted.  Mouth: Teeth: Some dental work but in decent shape   Tongue: Normal  Oropharynx:  Mallampati Classification: II    Tonsils: Grade 1- R and 1 L    Uvula: Normal    Neck: Supple without bruit.      Cardiac: Regular rate and rhythm.  No murmurs.  Radial pulses are strong and symmetric.  Pulmonary: Symmetric air movement, lungs clear to auscultation bilaterally.  Musculoskeletal: No edema noted.  No clubbing or cyanosis.  Skin: Warm, dry, intact.  Neurologic: Alert and oriented to person, place and time   Gait is normal.  Psychiatric: Affect pleasant.  Mood good.     Labs/Studies:  Lab Results   Component Value Date    WBC 5.2 04/05/2016    HGB 16.4 04/05/2016    HCT 48.3 04/05/2016    MCV 90 04/05/2016     04/05/2016         Chemistry        Component Value Date/Time     02/22/2019 0928    K 4.6 02/22/2019 0928     02/22/2019 0928    CO2 26 02/22/2019 0928    BUN 9 02/22/2019 0928    CREATININE 1.02 02/22/2019 0928    GLU 94 02/22/2019 0928        Component Value Date/Time    CALCIUM 9.5 02/22/2019 0928    ALKPHOS 75 02/22/2019 0928    AST 23 02/22/2019 0928    ALT 27 02/22/2019 0928    BILITOT 0.6 02/22/2019 0928        No results found for: FERRITIN  No results found for: TSH  No results found for: HGBA1C    Assessment and Plan:  1. Obstructive Sleep Apnea  Ambulatory referral to Dentistry   2. Excessive sleepiness  Ambulatory referral to Dentistry     I reviewed the diagnosis of obstructive sleep apnea with patient.  We discussed consequences of untreated Obstructive Sleep Apnea and benefits of treatment.  He is interested in starting treatment of SHANNAN with MAD therapy.  Reviewed importance of nightly therapy for effective treatment of SHANNAN, and he voiced understanding and agreement.  We also reviewed importance of using device during the entire sleep duration to achieve maximal benefits.    Patient verbalized understanding of  these issues, agrees with the plan and all questions were answered today. Patient was given an opportuntity to voice any other symptoms or concerns not listed above. Patient did not have any other symptoms or concerns.      MD BRET Gorman Board Certified in Internal Medicine and Sleep Medicine  [facility].

## 2021-06-03 VITALS
SYSTOLIC BLOOD PRESSURE: 133 MMHG | HEIGHT: 72 IN | HEART RATE: 78 BPM | DIASTOLIC BLOOD PRESSURE: 82 MMHG | BODY MASS INDEX: 25.56 KG/M2 | WEIGHT: 188.7 LBS | OXYGEN SATURATION: 97 %

## 2021-06-03 VITALS
BODY MASS INDEX: 24.69 KG/M2 | HEART RATE: 89 BPM | WEIGHT: 182.3 LBS | SYSTOLIC BLOOD PRESSURE: 90 MMHG | OXYGEN SATURATION: 97 % | TEMPERATURE: 98.5 F | DIASTOLIC BLOOD PRESSURE: 62 MMHG | HEIGHT: 72 IN

## 2021-06-04 VITALS
SYSTOLIC BLOOD PRESSURE: 110 MMHG | HEIGHT: 72 IN | BODY MASS INDEX: 24.42 KG/M2 | TEMPERATURE: 98.1 F | WEIGHT: 180.3 LBS | OXYGEN SATURATION: 98 % | HEART RATE: 75 BPM | DIASTOLIC BLOOD PRESSURE: 66 MMHG

## 2021-06-04 NOTE — PROGRESS NOTES
ASSESSMENT:  1. Cough    I gave him a prednisone taper that he could start if he desires to make this wheezing get over quicker, and might help him breathe a little better.  He is going to take the prescription and think about it and see how he does over the next couple of days.  I did tell him that I do not think he needs any more further antibiotics at this time.  He does seem to be getting better over the last several days so likely he is starting to come out of this on his own.    If he seems to be getting worse instead of better or if her fever returns or symptoms change she will let us know.      - predniSONE (DELTASONE) 10 mg tablet; Take 30 mg by mouth daily for 3 days, THEN 20 mg daily for 3 days, THEN 10 mg daily for 3 days.  Dispense: 18 tablet; Refill: 0          PLAN:  There are no Patient Instructions on file for this visit.    No orders of the defined types were placed in this encounter.    Medications Discontinued During This Encounter   Medication Reason     cefdinir (OMNICEF) 300 MG capsule Therapy completed       No follow-ups on file.    CHIEF COMPLAINT:  Chief Complaint   Patient presents with     Cough     11/2 had body aches - was given Abx by  provider and finished that about 7-10 days ago - cough was dry and took breath away before Abx, now cough has return post Abx and is productive and has nasal congestion as well. Wife is a Physician and gave pt an        HISTORY OF PRESENT ILLNESS:  Jermaine is a 59 y.o. male presenting to the clinic today for a persistent cough.  He states that he had an illness that started about a month ago.  He started with body aches and some chills and eventually went into his  urgent care provider and they gave him some antibiotics which she finished about 10 days ago.  Now the cough has been dry again and a bit wheezy.  He had a occasional production of some sputum but generally is just a bit of a wheezy cough now at this point.  It just does not want to seem  to completely resolved.  His wife is a pediatrician and suggested that he try an inhaler which has helped minimally but he thought that he should come in and get seen again to see if there is anything else that he should be doing.    He has no fevers or chills at this point.  His appetite is been good.  No nausea vomiting or diarrhea.  No skin rashes noted.  He has been taking some over-the-counter medications for cold symptoms which have been minimally helpful.    REVIEW OF SYSTEMS:     All other systems are negative.    PFSH:    Reviewed      TOBACCO USE:  Social History     Tobacco Use   Smoking Status Never Smoker   Smokeless Tobacco Never Used       VITALS:  Vitals:    12/05/19 0818   BP: 90/62   Patient Site: Left Arm   Patient Position: Sitting   Cuff Size: Adult Regular   Pulse: 89   Temp: 98.5  F (36.9  C)   SpO2: 97%   Weight: 182 lb 4.8 oz (82.7 kg)   Height: 6' (1.829 m)     Wt Readings from Last 3 Encounters:   12/05/19 182 lb 4.8 oz (82.7 kg)   10/11/19 188 lb 11.2 oz (85.6 kg)   05/08/19 187 lb 14.4 oz (85.2 kg)     Body mass index is 24.72 kg/m .    PHYSICAL EXAM:  Constitutional:  Well appearing patient in no acute distress.  Vitals:  Per nursing notes.    HEENT:  Normocephalic, atraumatic.  Ears are clear bilaterally, with no fluid or redness, and landmarks visible.  Pupils are equal and reactive to light, extraocular muscles intact, visual fields are full.  Nose is normal, and oropharynx is clear without redness.    Neck is without lymphadenopathy.    Lungs: A few wheezes heard but otherwise clear..   Cardiac:  Regular rate and rhythm without murmurs, rubs, or gallops.     Legs show no cyanosis, clubbing or edema.  Palpation of the distal pulses are normal and symmetric.      MEDICATIONS:  Current Outpatient Medications   Medication Sig Dispense Refill     albuterol (VENTOLIN HFA) 90 mcg/actuation inhaler Inhale 2 puffs every 6 (six) hours as needed for wheezing.       loratadine (CLARITIN) 10 mg  tablet Take 10 mg by mouth daily.       predniSONE (DELTASONE) 10 mg tablet Take 30 mg by mouth daily for 3 days, THEN 20 mg daily for 3 days, THEN 10 mg daily for 3 days. 18 tablet 0     No current facility-administered medications for this visit.

## 2021-06-05 ENCOUNTER — RECORDS - HEALTHEAST (OUTPATIENT)
Dept: FAMILY MEDICINE | Facility: CLINIC | Age: 61
End: 2021-06-05

## 2021-06-07 NOTE — PROGRESS NOTES
"Jermaine Hoyos is a 60 y.o. male who is being evaluated via a billable telephone visit.      The patient has been notified of following:     \"This telephone visit will be conducted via a call between you and your physician/provider. We have found that certain health care needs can be provided without the need for a physical exam.  This service lets us provide the care you need with a short phone conversation.  If a prescription is necessary we can send it directly to your pharmacy.  If lab work is needed we can place an order for that and you can then stop by our lab to have the test done at a later time.    Telephone visits are billed at different rates depending on your insurance coverage. During this emergency period, for some insurers they may be billed the same as an in-person visit.  Please reach out to your insurance provider with any questions.    If during the course of the call the physician/provider feels a telephone visit is not appropriate, you will not be charged for this service.\"    Patient has given verbal consent to a Telephone visit? Yes    Patient would like to receive their AVS by AVS Preference: Tracey.    Clinic Note    Assessment:     Assessment and Plan:    1. Nocturia    We spoke at length today about the nature of his symptoms.    He could be dealing with worsening BPH, for which tamsulosin was prescribed today.  I do not think that he is dealing with a UTI or prostatitis.    His symptoms started when he began sleeping in a different bed; his wife is a physician-they have been trying to keep her healthy and have been keeping a distance from each other.  He admits that his sleeping habits have been different since this change in sleep environment.  His nocturia could be behavioral in this regard.    He is going to start by decreasing his evening fluid intake.  He is going to cut out alcohol.  If his symptoms persist, he will try the Flomax.  If his symptoms persist despite using the " Flomax, he should call us back; we could consider rechecking a PSA and checking a urinalysis at that point.       Patient Instructions   Prescription for tamsulosin sent into your pharmacy.  Take 1 capsule every morning with breakfast.    This medication can cause dizziness and slight headache.  These side effects usually lessen after a weeks worth of use.    To start with, decrease your evening fluid intake.  Cut out alcohol completely.  No caffeine after noon.    It does not sound like you are dealing with a UTI or prostatitis.    Call back to the clinic if your symptoms were to worsen or persist.           Subjective:      Jermaine Hoyos is a 60 y.o. male who seeks telephone consultation today with regard to some increasing urinary frequency, specifically at night.    Patient does have a history of elevated PSA which has been stable since 2016.    He also has a history of BPH, does not typically cause issues such as nocturia.    His wife is a physician.  He and his wife have been sleeping in different beds since the coronavirus outbreak, to try and keep her healthy.  Since sleeping in different beds, patient has had decreased sleep quality with waking up more frequently in the early hours of the morning.    Within the last 2 weeks, he has been getting up 2-3 times per night.    He has a history of chronic low back pain with sciatica and says that he does not typically adjust well to positional changes in his bed; he has a hard time getting comfortable.    He denies any fevers.  No abdominal pain.  He has not noticed any strange discharge from his penis.  No hematuria.  No history of prostatitis.    The following portions of the patient's history were reviewed and updated as appropriate: Allergies, medications, problems, prior note.    Review of Systems:    Review is otherwise negative except for what is mentioned above.     Social Hx:    Social History     Tobacco Use   Smoking Status Never Smoker   Smokeless  Tobacco Never Used         Objective:   There were no vitals filed for this visit.    Exam: Not done      Patient Active Problem List   Diagnosis     Squamous Cell Carcinoma Of The Skin Of The Face     Obstructive Sleep Apnea     Benign Adenomatosis Of The Large Intestine     Hyperlipidemia     Elevated PSA     BPH (benign prostatic hyperplasia)     Low back pain     Current Outpatient Medications   Medication Sig Dispense Refill     albuterol (VENTOLIN HFA) 90 mcg/actuation inhaler Inhale 2 puffs every 6 (six) hours as needed for wheezing.       loratadine (CLARITIN) 10 mg tablet Take 10 mg by mouth daily.       tamsulosin (FLOMAX) 0.4 mg cap Take 1 capsule (0.4 mg total) by mouth Daily after breakfast. 30 capsule 0     No current facility-administered medications for this visit.          Ming Kline CNP (Rob)    4/15/2020       Phone call duration:  30 minutes    Peggy Carbone MA

## 2021-06-07 NOTE — PATIENT INSTRUCTIONS - HE
Prescription for tamsulosin sent into your pharmacy.  Take 1 capsule every morning with breakfast.    This medication can cause dizziness and slight headache.  These side effects usually lessen after a weeks worth of use.    To start with, decrease your evening fluid intake.  Cut out alcohol completely.  No caffeine after noon.    It does not sound like you are dealing with a UTI or prostatitis.    Call back to the clinic if your symptoms were to worsen or persist.

## 2021-06-08 NOTE — TELEPHONE ENCOUNTER
RN cannot approve Refill Request    RN can NOT refill this medication PCP messaged that patient is overdue for Office Visit. Last office visit: 9/19/2016 Edinson Morales MD Last Physical: 2/5/2019 Last MTM visit: Visit date not found Last visit same specialty: 12/5/2019 Abhishek Enriquez MD.  Next visit within 3 mo: Visit date not found  Next physical within 3 mo: Visit date not found      Janeth Laughlin, [facility] Triage/Med Refill 6/10/2020    Requested Prescriptions   Pending Prescriptions Disp Refills     tamsulosin (FLOMAX) 0.4 mg cap [Pharmacy Med Name: TAMSULOSIN HCL 0.4 MG CAPSULE] 30 capsule 0     Sig: TAKE 1 CAPSULE BY MOUTH DAILY AFTER BREAKFAST.       Alfuzosin/Tamsulosin/Silodosin Refill Protocol  Failed - 6/9/2020  8:34 AM        Failed - PCP or prescribing provider visit in past 12 months       Last office visit with prescriber/PCP: 9/19/2016 Edinson Morales MD OR same dept: Visit date not found OR same specialty: 12/5/2019 Abhishek Enriquez MD  Last physical: 2/5/2019 Last MTM visit: Visit date not found   Next visit within 3 mo: Visit date not found  Next physical within 3 mo: Visit date not found  Prescriber OR PCP: Edinson Morales MD  Last diagnosis associated with med order: 1. Nocturia  - tamsulosin (FLOMAX) 0.4 mg cap [Pharmacy Med Name: TAMSULOSIN HCL 0.4 MG CAPSULE]; TAKE 1 CAPSULE BY MOUTH DAILY AFTER BREAKFAST.  Dispense: 30 capsule; Refill: 0    If protocol passes may refill for 12 months if within 3 months of last provider visit (or a total of 15 months).

## 2021-06-08 NOTE — TELEPHONE ENCOUNTER
RN cannot approve Refill Request    RN can NOT refill this medication Protocol failed and NO refill given.       Brunilda Gonzalez, [facility] Triage/Med Refill 5/12/2020    Requested Prescriptions   Pending Prescriptions Disp Refills     tamsulosin (FLOMAX) 0.4 mg cap [Pharmacy Med Name: TAMSULOSIN HCL 0.4 MG CAPSULE] 90 capsule 3     Sig: TAKE 1 CAPSULE (0.4 MG TOTAL) BY MOUTH DAILY AFTER BREAKFAST.       Alfuzosin/Tamsulosin/Silodosin Refill Protocol  Failed - 5/11/2020 10:30 AM        Failed - PCP or prescribing provider visit in past 12 months       Last office visit with prescriber/PCP: Visit date not found OR same dept: Visit date not found OR same specialty: 12/5/2019 Abhishek Enriquez MD  Last physical: Visit date not found Last MTM visit: Visit date not found   Next visit within 3 mo: Visit date not found  Next physical within 3 mo: Visit date not found  Prescriber OR PCP: Ming Kline CNP  Last diagnosis associated with med order: 1. Nocturia  - tamsulosin (FLOMAX) 0.4 mg cap [Pharmacy Med Name: TAMSULOSIN HCL 0.4 MG CAPSULE]; Take 1 capsule (0.4 mg total) by mouth Daily after breakfast.  Dispense: 30 capsule; Refill: 0    If protocol passes may refill for 12 months if within 3 months of last provider visit (or a total of 15 months).

## 2021-06-09 NOTE — TELEPHONE ENCOUNTER
Received Doxy script for qty of 400 yesterday. Please send in new script with appropriate SIG and quantity

## 2021-06-09 NOTE — TELEPHONE ENCOUNTER
Refill Approved    Rx renewed per Medication Renewal Policy. Medication was last renewed on 6/11/20.    Brunilda Gonzalez, [facility] Connection Triage/Med Refill 7/12/2020     Requested Prescriptions   Pending Prescriptions Disp Refills     tamsulosin (FLOMAX) 0.4 mg cap [Pharmacy Med Name: TAMSULOSIN HCL 0.4 MG CAPSULE] 30 capsule 0     Sig: TAKE 1 CAPSULE BY MOUTH DAILY AFTER BREAKFAST.       Alfuzosin/Tamsulosin/Silodosin Refill Protocol  Passed - 7/11/2020  7:39 AM        Passed - PCP or prescribing provider visit in past 12 months       Last office visit with prescriber/PCP: 9/19/2016 Edinson Morales MD OR same dept: Visit date not found OR same specialty: 12/5/2019 Abhishek Enriquez MD  Last physical: 2/5/2019 Last MTM visit: Visit date not found   Next visit within 3 mo: Visit date not found  Next physical within 3 mo: Visit date not found  Prescriber OR PCP: Edinson Morales MD  Last diagnosis associated with med order: 1. Nocturia  - tamsulosin (FLOMAX) 0.4 mg cap [Pharmacy Med Name: TAMSULOSIN HCL 0.4 MG CAPSULE]; TAKE 1 CAPSULE BY MOUTH DAILY AFTER BREAKFAST.  Dispense: 30 capsule; Refill: 0    If protocol passes may refill for 12 months if within 3 months of last provider visit (or a total of 15 months).

## 2021-06-09 NOTE — PROGRESS NOTES
"Jermaine Hoyos is a 60 y.o. male who is being evaluated via a billable video visit.      The patient has been notified of following:     \"This video visit will be conducted via a call between you and your physician/provider. We have found that certain health care needs can be provided without the need for an in-person physical exam.  This service lets us provide the care you need with a video conversation.  If a prescription is necessary we can send it directly to your pharmacy.  If lab work is needed we can place an order for that and you can then stop by our lab to have the test done at a later time.    Video visits are billed at different rates depending on your insurance coverage. Please reach out to your insurance provider with any questions.    If during the course of the call the physician/provider feels a video visit is not appropriate, you will not be charged for this service.\"    Patient has given verbal consent to a Video visit? Yes  How would you like to obtain your AVS? AVS Preference: MyChart.  If dropped by the video visit, the video invitation should be sent to: Send to e-mail at: [email or web address]  Will anyone else be joining your video visit? No        Video Start Time: 4:23 PM    1. Tick bite, initial encounter  Bambi possible diagnoses and treatment for possible tick bite.  I did inform patient to make sure there is nothing left in the site.  Do recommend a dose of doxycycline.  Did recommend Lyme titer.  - Lyme Antibody Cascade; Future  - doxycycline (MONODOX) 100 MG capsule; Take 2 capsules (200 mg total) by mouth once for 1 dose.  Dispense: 2 capsule; Refill: 0    Subjective:  Lc 6 Romain, 60 years old male who reported to the clinic via virtual visit with complaint of redness of his left shoulder.  He reported that on Friday night he pulled something out of his shoulder and by Saturday he was feeling feverish, body ache, headache and he thought he had COVID but later he realized that he " does have a rash on his shoulder so he thought he might have pulled a tick out of his body without knowing.  Rash has gotten bigger and is fever has not gone higher up to 102.  He stated that he does have an appointment to get COVID testing but he is sure he might be having reaction to the tick bite possible and Lyme disease.  Patient denied any other symptoms.    Additional provider notes: GENERAL: Healthy, alert and no distress  EYES: Eyes grossly normal to inspection. No discharge or erythema, or obvious scleral/conjunctival abnormalities.  RESP: No audible wheeze, cough, or visible cyanosis.  No visible retractions or increased work of breathing.    SKIN: erythema - Left shoulder.  NEURO: Cranial nerves grossly intact. Mentation and speech appropriate for age.  PSYCH: Mentation appears normal, affect normal/bright, judgement and insight intact, normal speech and appearance well-groomed      Video-Visit Details    Type of service:  Video Visit    Video End Time (time video stopped): 4:40 PM  Originating Location (pt. Location): Home    Distant Location (provider location):  [facility]     Platform used for Video Visit: GERA Martinez

## 2021-06-09 NOTE — TELEPHONE ENCOUNTER
"RN Triage,   Called yesterday with concern for COVID symptoms, referred via [facility] for testing. Today he has discovered a rash on his neck and upper chest that looks like a lymes rash. Temp now is 102.6. Did pull a very small \"skin tag\" off the other day from his neck, but did not look at it, could have been a tick.  Had developed fever, body aches, fatigue on Saturday. Has had fever all this week. Rash is red and bullseye but not perfectly round, about 5-6 inches long 3 inches wide. Hard region in the center. His wife is a physician and confirms it does look like a bullseye.     I recommended to Lc that he be scheduled for a video visit today with a provider. He agrees to this plan, currently activating his [employer]. Scheduling to assist with video visit.    Beverly Sampson RN  [facility] Nurse Advisor      Reason for Disposition    Red ring or bull's-eye rash occurs around a deer tick bite    Fever and area is very tender to touch    Additional Information    Negative: Patient sounds very sick or weak to the triager    Protocols used: TICK BITE-A-[place]    COVID 19 Nurse Triage Plan/Patient Instructions    Please be aware that novel coronavirus (COVID-19) may be circulating in the community. If you develop symptoms such as fever, cough, or SOB or if you have concerns about the presence of another infection including coronavirus (COVID-19), please contact your health care provider or visit [email or web address].     Disposition/Instructions    Virtual Visit with provider recommended. Reference Visit Selection Guide.    Thank you for taking steps to prevent the spread of this virus.  o Limit your contact with others.  o Wear a simple mask to cover your cough.  o Wash your hands well and often.    Resources    [facility]: About COVID-19: [email or web address]    CDC: What to Do If You're Sick: www.cdc.gov/coronavirus/2019-ncov/about/steps-when-sick.html    CDC: Ending Home Isolation: " www.cdc.gov/coronavirus/2019-ncov/hcp/disposition-in-home-patients.html     CDC: Caring for Someone: www.cdc.gov/coronavirus/2019-ncov/if-you-are-sick/care-for-someone.html     [facility]: Interim Guidance for Hospital Discharge to Home: www.[facility].[place]/diseases/coronavirus/hcp/hospdischarge.pdf    [facility] clinical trials (COVID-19 research studies): clinicalaffairs.[facility]/n-clinical-trials     Below are the COVID-19 hotlines at the [place] Department of [facility] ([facility]). Interpreters are available.   o For health questions: Call 729-439-2132 or 1-599.172.6908 (7 a.m. to 7 p.m.)  o For questions about schools and childcare: Call 410-988-6562 or 1-219.863.4914 (7 a.m. to 7 p.m.)

## 2021-06-10 NOTE — TELEPHONE ENCOUNTER
I did pend the medication, there were two different scripts from the past. Please verify dispense amount and directions.

## 2021-06-10 NOTE — TELEPHONE ENCOUNTER
Travel Screening in [facility].  Patient states Dr. Otto had previously prescribed acetazolamide 125mg for altitude sickness.  Patient is running out of medication and will be traveling soon, leaving 8/21/20.  Requesting [place] be [place] [facility] [place] [address] [facility] [place]    Fracnine Hurtado RN  [facility] Triage Nurse Advisor    Please close encounter when completed.

## 2021-06-11 NOTE — PROGRESS NOTES
Office Visit - Follow Up   Jermaine Hoyos   60 y.o. male    Date of Visit: 9/9/2020    Chief Complaint   Patient presents with     Establish Care     Not fasting     Annual Exam     Lyme's f/u, frequent urination, sleep, medication reaction        -------------------------------------------------------------------------------------------------------------------------  Assessment and Plan    60-year-old  for [employer], comes to establish primary care.  Issues are Lyme disease in mid July 2020 with characteristic ECM lesion, treated with doxycycline, will recheck his Lyme antibody status.  Obstructive sleep apnea, successfully using oral appliance, sleep apnea has been positional in nature, better when he sleeps on his side.  Benign prostatic hyperplasia with mildly enlarged gland on physical exam, mildly elevated PSAs observed for at least 4 years, intolerance to tamsulosin which made him lightheaded, will try him on finasteride to try to relieve his main symptom of bothersome nocturia.  Chronic low back pain with right sciatica (tingling right big toe) with lumbar degenerative disc disease seen on MRI scan most recently July 2014, needs to implement a back and core muscle strengthening program.  History of squamous cell carcinoma on the left side of the nose approximately 2000.  History of colon polyp seen in 2013, subsequent colonoscopy February 2018 normal, needs recheck 5 years later which would be 2023.  Hyperlipidemia with LDL levels in the mid 150s, low HDL, history of difficulty tolerating rosuvastatin and atorvastatin, suggest coronary calcium scan to help with your stratification.  History of altitude sickness for which he uses preventive acetazolamide. Occasional wheezing uses albuterol for that.  Seasonal nasal allergies uses Claritin.  Could consider getting recombinant shingles vaccine.    He is not fasting this morning but is willing to come back on a subsequent morning to get lab work  which would be comprehensive metabolic panel, lipid profile, blood cell counts, thyroid cascade, screening PSA, and will check Lyme antibody titer.    Going issue by issue:    Lyme disease in mid July 2020 with characteristic ECM lesion, treated with doxycycline, will recheck his Lyme antibody status.  He showed me a photograph of the ECM lesion which was on his left shoulder.  It was larger than the span of the hand.  It looks like classic ECM.  He recalled a small bump in the center that area that he scratched off, and that probably was the Ixodes tick.  He took 2 weeks of doxycycline.  I told him that we could be very confident that the doxycycline eradicated the Lyme bacteria.  I think it does make sense to get up Lyme antibody test to see if there is a bump in his antibody titer.  That would be of a confirmatory nature.  He knows that even if the antibody comes back elevated, it just means that his body has reacted to the Lyme organism, and I still believe that he has no active Lyme infection going on now.  He should be cured.    Obstructive sleep apnea, successfully using oral appliance, sleep apnea has been positional in nature, better when he sleeps on his side.  He believes that the oral appliance has corrected the sleep apnea component that was disrupting his sleep.  Now it disrupts his sleep is the nighttime urination.    Benign prostatic hyperplasia with mildly enlarged gland on physical exam, mildly elevated PSAs observed for at least 4 years, intolerance to tamsulosin which made him lightheaded, will try him on finasteride to try to relieve his main symptom of bothersome nocturia.  Since tamsulosin was unsuccessful, I think we should try the 5 alpha reductase blocker finasteride.  I told her that it gradually shrink the prostate gland over about a year.  There is a potential for side effects affecting libido and erections.  If that occurs, simply stop the medication and those side effects should  disappear.  But I told him to be patient, because finasteride will take months to work.  PSA usually drops by about 30%  on finasteride.    Chronic low back pain with right sciatica (tingling right big toe) with lumbar degenerative disc disease seen on MRI scan most recently July 2014, needs to implement a back and core muscle strengthening program.  He has seen a physical therapist before, and knows what exercises he needs to start    History of squamous cell carcinoma on the left side of the nose approximately 2000.      History of colon polyp seen in 2013, subsequent colonoscopy February 2018 normal, needs recheck 5 years later which would be 2023.      Hyperlipidemia with LDL levels in the mid 150s, low HDL, history of difficulty tolerating rosuvastatin and atorvastatin, suggest coronary calcium scan to help with risk stratification.  I told her that if the coronary calcium scan is positive, that would make a stronger case to get aggressive with his lipids.  I think that would involve rechallenge with a statin, and if tolerated, gradually escalate the dose.  If truly intolerant of statins, might need to consider a PCSK9 inhibitor (Repatha or Praluent).  He is not experiencing any cardiac symptoms or has he had any events.  Link where he can research getting a coronary calcium scan, which I told him is generally not covered by insurance  [email or web address]    History of altitude sickness for which he uses preventive acetazolamide.     Occasional wheezing uses albuterol for that.      Seasonal nasal allergies uses Claritin.     Could consider getting recombinant shingles vaccine.    --------------------------------------------------------------------------------------------------------------------------  History of Present Illness  This 60 y.o. old man   for [employer], comes to establish primary care.  Issues are Lyme disease in mid July 2020 with characteristic ECM  lesion, treated with doxycycline, will recheck his Lyme antibody status.  Obstructive sleep apnea, successfully using oral appliance, sleep apnea has been positional in nature, better when he sleeps on his side.  Benign prostatic hyperplasia with mildly enlarged gland on physical exam, mildly elevated PSAs observed for at least 4 years, intolerance to tamsulosin which made him lightheaded, will try him on finasteride to try to relieve his main symptom of bothersome nocturia.  Chronic low back pain with right sciatica (tingling right big toe) with lumbar degenerative disc disease seen on MRI scan most recently July 2014, needs to implement a back and core muscle strengthening program.  History of squamous cell carcinoma on the left side of the nose approximately 2000.  History of colon polyp seen in 2013, subsequent colonoscopy February 2018 normal, needs recheck 5 years later which would be 2023.  Hyperlipidemia with LDL levels in the mid 150s, low HDL, history of difficulty tolerating rosuvastatin and atorvastatin, suggest coronary calcium scan to help with your stratification.  History of altitude sickness for which he uses preventive acetazolamide. Occasional wheezing uses albuterol for that.  Seasonal nasal allergies uses Claritin.  Could consider getting recombinant shingles vaccine.    Tick bite, initial encounter 7-20-20  Tick bite a week earlier-- left shoulder, showed picture loooks like ECM  Fevers, aches  Was doing yardwork  His wife is a pediatrician  Bambi possible diagnoses and treatment for possible tick bite.  I did inform patient to make sure there is nothing left in the site. Rx a dose of doxycycline  7/21/20 1144  Lyme Antibody [place]  <0.90 Index Value  0.45      SHANNAN using oral appliance  Originally diagnosed with SHANNAN via [facility] in 2006 with AHI of 20.  Repeat testing with PSG in 2009 showed AHI of 8.1 with supine dependent SHANNAN (supine AHI 19.2/hr vs non-supine of 0.5/hr).  Was told to sleep  lateral which worked well until he developed pain issues.  Now can't sleep lateral and symptoms have obviously returned.  Having frequent snort arousals with witnessed apneas, loud snoring, and excessive sleepiness.  Obstructive Sleep Apnea    Sleep on side  Got oral appliance-- December 2019  He thinks he sleeps 6 most nights  1 glass of wine or beer a day  Exercise: more last few months, walking, biking    BPH (benign prostatic hyperplasia) 02/05/2019  Symptoms in 50's  Nocturia (4X per evening), frequency, urgency  Elevated PSA 09/19/2016  Dx late 40's  Level 5.8  Lab Results   Component Value Date    PSA 4.9 (H) 02/22/2019    PSA 5.5 (H) 12/04/2017    PSA 5.1 (H) 01/30/2017   Tamsulosin made lighted    Low back pain 02/05/2019  With R sciatica  Lumbar DDD  7-15-14  IMPRESSION:   CONCLUSION:  1.  Stable exam.  2.  Spondylotic changes lumbar spine with mild retrolisthesis L5-S1 with broad-based disc protrusion and a right-sided extruded fragment with mild mass effect upon the traversing right S1 nerve root. Moderate bilateral foraminal stenoses at the L5-S1    Squamous Cell Carcinoma Of The Skin Of The Face    Dx around 2000  Left nasal area    Benign Adenomatosis Of The Large Intestine    colonoscipy done 2/14/13  Colonoscopy February 13, 2018 NORMAL  5 years    Hyperlipidemia   Dx 2013  Did not tolerate Crestor or atorvastatin-- memory effects  LDL to 192; Trig to 312; HDL to 38  Simvastatin-not taking  Never smoker  Family history benign  Lab Results   Component Value Date    CHOL 231 (H) 02/22/2019    CHOL 253 (H) 12/04/2017    CHOL 292 (H) 01/30/2017     Lab Results   Component Value Date    HDL 43 02/22/2019    HDL 38 (L) 12/04/2017    HDL 38 (L) 01/30/2017     Lab Results   Component Value Date    LDLCALC 142 (H) 02/22/2019    LDLCALC 177 (H) 12/04/2017    LDLCALC 192 (H) 01/30/2017     Lab Results   Component Value Date    TRIG 231 (H) 02/22/2019    TRIG 189 (H) 12/04/2017    TRIG 312 (H) 01/30/2017     No  components found for: CHOLHDL    Altitude sickness history  acetaZOLAMIDE (DIAMOX) 125 MG tablet    Occasional wheeze  albuterol (VENTOLIN HFA) 90 mcg/actuation inhaler    Nasal allergies-- seasonal    loratadine (CLARITIN) 10 mg tablet    Wt Readings from Last 3 Encounters:   09/09/20 180 lb 4.8 oz (81.8 kg)   12/05/19 182 lb 4.8 oz (82.7 kg)   10/11/19 188 lb 11.2 oz (85.6 kg)     BP Readings from Last 3 Encounters:   09/09/20 110/66   12/05/19 90/62   10/11/19 133/82     Immunization History   Administered Date(s) Administered     Hep A / Hep B 12/17/2008, 01/15/2009, 06/25/2009     Influenza [id number], 02/02/2010     Influenza, inj, historic,unspecified 09/22/2009, 10/10/2010, 10/01/2011, 10/01/2012     Influenza,seasonal,quad inj =/> 6months 09/19/2016     Td,adult,historic,unspecified 03/27/1995, 03/13/2008     Tdap 02/04/2013   Shingles to consider      Lab Results   Component Value Date    WBC 5.2 04/05/2016    HGB 16.4 04/05/2016    HCT 48.3 04/05/2016     04/05/2016    CHOL 231 (H) 02/22/2019    TRIG 231 (H) 02/22/2019    HDL 43 02/22/2019    ALT 27 02/22/2019    AST 23 02/22/2019     02/22/2019    K 4.6 02/22/2019     02/22/2019    CREATININE 1.02 02/22/2019    BUN 9 02/22/2019    CO2 26 02/22/2019    PSA 4.9 (H) 02/22/2019        Review of Systems: A comprehensive review of systems was negative except as noted.  ---------------------------------------------------------------------------------------------------------------------------    Medications, Allergies, Social, and Problem List   Current Outpatient Medications   Medication Sig Dispense Refill     acetaZOLAMIDE (DIAMOX) 125 MG tablet Take 1 tablet twice daily, beginning one day prior to elevated altitude, and continue twice daily while at elevated altitude. 20 tablet 0     albuterol (VENTOLIN HFA) 90 mcg/actuation inhaler Inhale 2 puffs every 6 (six) hours as needed for wheezing.       loratadine (CLARITIN) 10 mg tablet Take 10 mg  "by mouth daily.       tamsulosin (FLOMAX) 0.4 mg cap TAKE 1 CAPSULE BY MOUTH DAILY AFTER BREAKFAST. 30 capsule 7     No current facility-administered medications for this visit.      Allergies   Allergen Reactions     Atorvastatin Other (See Comments)     Memory loss, joint aches     Ibuprofen Itching     Palms, itching     Naproxen Sodium Itching     palms     Rosuvastatin Other (See Comments)     Memory loss, muscle aches     Tolmetin Itching     Itching palms and feet     Social History     Tobacco Use     Smoking status: Never Smoker     Smokeless tobacco: Never Used   Substance Use Topics     Alcohol use: Yes     Alcohol/week: 5.0 - 10.0 standard drinks     Types: 5 - 10 Cans of beer per week     Drug use: No     Patient Active Problem List   Diagnosis     Squamous Cell Carcinoma Of The Skin Of The Face     Obstructive Sleep Apnea     Benign Adenomatosis Of The Large Intestine     Hyperlipidemia     Elevated PSA     BPH (benign prostatic hyperplasia)     Low back pain        Reviewed, reconciled and updated       Physical Exam   General Appearance: Appears well, healthy weight, excellent blood pressure, breathing comfortably, moves easily around exam room.    /66 (Patient Site: Right Arm, Patient Position: Sitting, Cuff Size: Adult Large)   Pulse 75   Temp 98.1  F (36.7  C) (Oral)   Ht 6' 0.25\" (1.835 m)   Wt 180 lb 4.8 oz (81.8 kg)   SpO2 98%   BMI 24.28 kg/m      General: Alert, in no distress  Skin: No significant lesion seen.  Eyes/nose/throat: Eyes without scleral icterus, eye movements normal, pupils equal and reactive, oropharynx clear, ears with normal TM's  MSK: Neck with good ROM  Lymphatic: Neck without adenopathy or masses  Endocrine: Thyroid with no nodules to palpation  Pulm: Lungs clear to auscultation bilaterally  Cardiac: Heart with regular rate and rhythm, no murmur or gallop  GI: Abdomen soft, nontender. No palpable enlargement of liver or spleen  MSK: Extremities no tenderness or " edema  Neuro: Moves all extremities, without focal weakness  Psych: Alert, normal mental status. Normal affect and speech  Prostate 1+ enlarged, symmetrical, smooth, no nodules to palpation.     Additional Information   I spent 45 minutes face time with the patient, with > 50% counseling, explaining and discussing with the patient the issues enumerated in the Assessment and Plan section of this note and answering questions. Afterwards, the patient was given a printout of the AVS, with attention to the content in the Patient Instruction section.       Dallas Alexis MD

## 2021-06-11 NOTE — PATIENT INSTRUCTIONS - HE
60-year-old  for [employer], comes to establish primary care.  Issues are Lyme disease in mid July 2020 with characteristic ECM lesion, treated with doxycycline, will recheck his Lyme antibody status.  Obstructive sleep apnea, successfully using oral appliance, sleep apnea has been positional in nature, better when he sleeps on his side.  Benign prostatic hyperplasia with mildly enlarged gland on physical exam, mildly elevated PSAs observed for at least 4 years, intolerance to tamsulosin which made him lightheaded, will try him on finasteride to try to relieve his main symptom of bothersome nocturia.  Chronic low back pain with right sciatica (tingling right big toe) with lumbar degenerative disc disease seen on MRI scan most recently July 2014, needs to implement a back and core muscle strengthening program.  History of squamous cell carcinoma on the left side of the nose approximately 2000.  History of colon polyp seen in 2013, subsequent colonoscopy February 2018 normal, needs recheck 5 years later which would be 2023.  Hyperlipidemia with LDL levels in the mid 150s, low HDL, history of difficulty tolerating rosuvastatin and atorvastatin, suggest coronary calcium scan to help with your stratification.  History of altitude sickness for which he uses preventive acetazolamide. Occasional wheezing uses albuterol for that.  Seasonal nasal allergies uses Claritin.  Could consider getting recombinant shingles vaccine.    He is not fasting this morning but is willing to come back on a subsequent morning to get lab work which would be comprehensive metabolic panel, lipid profile, blood cell counts, thyroid cascade, screening PSA, and will check Lyme antibody titer.    Going issue by issue:    Lyme disease in mid July 2020 with characteristic ECM lesion, treated with doxycycline, will recheck his Lyme antibody status.  He showed me a photograph of the ECM lesion which was on his left shoulder.  It was larger than  the span of the hand.  It looks like classic ECM.  He recalled a small bump in the center that area that he scratched off, and that probably was the Ixodes tick.  He took 2 weeks of doxycycline.  I told him that we could be very confident that the doxycycline eradicated the Lyme bacteria.  I think it does make sense to get up Lyme antibody test to see if there is a bump in his antibody titer.  That would be of a confirmatory nature.  He knows that even if the antibody comes back elevated, it just means that his body has reacted to the Lyme organism, and I still believe that he has no active Lyme infection going on now.  He should be cured.    Obstructive sleep apnea, successfully using oral appliance, sleep apnea has been positional in nature, better when he sleeps on his side.  He believes that the oral appliance has corrected the sleep apnea component that was disrupting his sleep.  Now it disrupts his sleep is the nighttime urination.    Benign prostatic hyperplasia with mildly enlarged gland on physical exam, mildly elevated PSAs observed for at least 4 years, intolerance to tamsulosin which made him lightheaded, will try him on finasteride to try to relieve his main symptom of bothersome nocturia.  Since tamsulosin was unsuccessful, I think we should try the 5 alpha reductase blocker finasteride.  I told her that it gradually shrink the prostate gland over about a year.  There is a potential for side effects affecting libido and erections.  If that occurs, simply stop the medication and those side effects should disappear.  But I told him to be patient, because finasteride will take months to work.  PSA usually drops by about 30%  on finasteride.    Chronic low back pain with right sciatica (tingling right big toe) with lumbar degenerative disc disease seen on MRI scan most recently July 2014, needs to implement a back and core muscle strengthening program.  He has seen a physical therapist before, and knows  what exercises he needs to start    History of squamous cell carcinoma on the left side of the nose approximately 2000.      History of colon polyp seen in 2013, subsequent colonoscopy February 2018 normal, needs recheck 5 years later which would be 2023.      Hyperlipidemia with LDL levels in the mid 150s, low HDL, history of difficulty tolerating rosuvastatin and atorvastatin, suggest coronary calcium scan to help with risk stratification.  I told her that if the coronary calcium scan is positive, that would make a stronger case to get aggressive with his lipids.  I think that would involve rechallenge with a statin, and if tolerated, gradually escalate the dose.  If truly intolerant of statins, might need to consider a PCSK9 inhibitor (Repatha or Praluent).  He is not experiencing any cardiac symptoms or has he had any events.  Link where he can research getting a coronary calcium scan, which I told him is generally not covered by insurance  [email or web address]    History of altitude sickness for which he uses preventive acetazolamide.     Occasional wheezing uses albuterol for that.      Seasonal nasal allergies uses Claritin.     Could consider getting recombinant shingles vaccine.

## 2021-06-12 NOTE — TELEPHONE ENCOUNTER
RN cannot approve Refill Request    RN can NOT refill this medication med is not covered by policy/route to provider. Last office visit: Visit date not found Last Physical: 9/9/2020 Last MTM visit: Visit date not found Last visit same specialty: 5/8/2019 Nehemiah Otto MD.  Next visit within 3 mo: Visit date not found  Next physical within 3 mo: Visit date not found      Diana Stein, [facility] Triage/Med Refill 10/3/2020    Requested Prescriptions   Pending Prescriptions Disp Refills     finasteride (PROSCAR) 5 mg tablet [Pharmacy Med Name: FINASTERIDE 5 MG TABLET] 30 tablet 2     Sig: TAKE 1 TABLET BY MOUTH EVERY DAY       There is no refill protocol information for this order

## 2021-06-13 NOTE — PROGRESS NOTES
"Jermaine Hoyos is a 60 y.o. male who is being evaluated via a billable video visit.      The patient has been notified of following:     \"This video visit will be conducted via a call between you and your physician/provider. We have found that certain health care needs can be provided without the need for an in-person physical exam.  This service lets us provide the care you need with a video conversation.  If a prescription is necessary we can send it directly to your pharmacy.  If lab work is needed we can place an order for that and you can then stop by our lab to have the test done at a later time.    Video visits are billed at different rates depending on your insurance coverage. Please reach out to your insurance provider with any questions.    If during the course of the call the physician/provider feels a video visit is not appropriate, you will not be charged for this service.\"    Patient has given verbal consent to a Video visit? Yes  How would you like to obtain your AVS? AVS Preference: MyChart.  If dropped by the video visit, the video invitation should be sent to: Text to cell phone: [phone]  Will anyone else be joining your video visit? No        Video Start Time: 11:01 AM      Video visit to discuss the significance of positive coronary artery calcification study, and strategies for controlling lipids in that context.    After his calcium scan, he had a consultation with preventive cardiology at [facility], and because of his history of past statin intolerances, it was suggested that he go on Praluent, and also Vascepa.      Cardiac Calcium Lab completed   *Mild to moderate calcium present.  *Requested copy sent to you.  *2 lesions present on LAD Artery.  *Plaque volume 23.8 mm3, Score 27.4  *Coronary Calcium score percentile, 46% for age and gender  *\"...mild calcified coronary artherosclerotic plaque burden, . . .\"  *\". . . mild non-obstructive coronary narrowing present w/ < 10% chance that " "there is flow limiting stenosis.\"  *Scan was done at [facility] cardiac Lab.    Let's try the less potent and also water-soluble statin pravastatin, which for some patients is better tolerated    Dose is 20 mg a day, taken at bedtime.    ASSESSMENT AND PLAN    Coronary artery calcifications demonstrated November 30, 2020, 46 percentile, absolute score 27.4 with 2 lesions present in the left anterior descending artery, mild nonobstructive disease, will attempt to control lipids first with a trial of pravastatin 20 mg a day.    For now, will hold off on using PCSK9 inhibitor Praluent and also hold off on Vascepa    He is going to focus on healthy diet, including eating fish and taking fish oil, lose weight, exercise more, and he is going to get started on the pravastatin.  We will have him get a fasting lipid profile in about 3 months.  Order entered.  He will need to call for laboratory appointment as that date approaches.    Goal would be to get the LDL cholesterol down to about 70.  If pravastatin not effective or he gets side effects, then PCSK9 could be considered.    Continue on baby aspirin 81 mg a day.    Hyperlipidemia with LDL levels in the mid 150s, low HDL, history of difficulty tolerating rosuvastatin and atorvastatin, he will initiate pravastatin as of December 9, 2020  Goal to get LDL cholesterol down to about 70 because of coronary artery calcifications  Lab Results   Component Value Date    CHOL 237 (H) 09/16/2020    CHOL 231 (H) 02/22/2019    CHOL 253 (H) 12/04/2017     Lab Results   Component Value Date    HDL 39 (L) 09/16/2020    HDL 43 02/22/2019    HDL 38 (L) 12/04/2017     Lab Results   Component Value Date    LDLCALC 133 (H) 09/16/2020    LDLCALC 142 (H) 02/22/2019    LDLCALC 177 (H) 12/04/2017     Lab Results   Component Value Date    TRIG 324 (H) 09/16/2020    TRIG 231 (H) 02/22/2019    TRIG 189 (H) 12/04/2017     No components found for: CHOLHDL    Lyme disease in mid July 2020 with " characteristic ECM lesion, treated with doxycycline, IgM blot positive September 16, 2020, but IgG negative  He took 2 weeks of doxycycline.  I told him that we could be very confident that the doxycycline eradicated the Lyme bacteria.   He should be cured.     Obstructive sleep apnea, successfully using oral appliance, sleep apnea has been positional in nature, better when he sleeps on his side.  He believes that the oral appliance has corrected the sleep apnea component that was disrupting his sleep.  Now it disrupts his sleep is the nighttime urination.     Benign prostatic hyperplasia with mildly enlarged gland on physical exam, mildly elevated PSAs observed for at least 4 years, started on finasteride September 2020   intolerance to tamsulosin which made him lightheaded  Lab Results   Component Value Date    PSA 4.3 (H) 09/16/2020    PSA 4.9 (H) 02/22/2019    PSA 5.5 (H) 12/04/2017      Chronic low back pain with right sciatica (tingling right big toe) with lumbar degenerative disc disease seen on MRI scan most recently July 2014, needs to implement a back and core muscle strengthening program.  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       History of colon polyp seen in 2013, subsequent colonoscopy February 2018 normal, needs recheck 5 years later which would be 2023.      History of altitude sickness for which he uses preventive acetazolamide.      Occasional wheezing uses albuterol for that.       Seasonal nasal allergies uses Claritin.      Could consider getting recombinant shingles vaccine.     Video-Visit Details    Type of service:  Video Visit    Video End Time (time video stopped): 11:20 AM  Originating Location (pt. Location): Home    Distant Location (provider location):  [facility]     Platform used for Video Visit:  Doximity      Dallas Alexis MD

## 2021-06-13 NOTE — PATIENT INSTRUCTIONS - HE
Coronary artery calcifications demonstrated November 30, 2020, 46 percentile, absolute score 27.4 with 2 lesions present in the left anterior descending artery, mild nonobstructive disease, will attempt to control lipids first with a trial of pravastatin 20 mg a day.    For now, will hold off on using PCSK9 inhibitor Praluent and also hold off on Vascepa    He is going to focus on healthy diet, including eating fish and taking fish oil, lose weight, exercise more, and he is going to get started on the pravastatin.  We will have him get a fasting lipid profile in about 3 months.  Order entered.  He will need to call for laboratory appointment as that date approaches.    Goal would be to get the LDL cholesterol down to about 70.  If pravastatin not effective or he gets side effects, then PCSK9 could be considered.    Continue on baby aspirin 81 mg a day.    Hyperlipidemia with LDL levels in the mid 150s, low HDL, history of difficulty tolerating rosuvastatin and atorvastatin, he will initiate pravastatin as of December 9, 2020  Goal to get LDL cholesterol down to about 70 because of coronary artery calcifications  Lab Results   Component Value Date    CHOL 237 (H) 09/16/2020    CHOL 231 (H) 02/22/2019    CHOL 253 (H) 12/04/2017     Lab Results   Component Value Date    HDL 39 (L) 09/16/2020    HDL 43 02/22/2019    HDL 38 (L) 12/04/2017     Lab Results   Component Value Date    LDLCALC 133 (H) 09/16/2020    LDLCALC 142 (H) 02/22/2019    LDLCALC 177 (H) 12/04/2017     Lab Results   Component Value Date    TRIG 324 (H) 09/16/2020    TRIG 231 (H) 02/22/2019    TRIG 189 (H) 12/04/2017     No components found for: CHOLHDL    Lyme disease in mid July 2020 with characteristic ECM lesion, treated with doxycycline, IgM blot positive September 16, 2020, but IgG negative  He took 2 weeks of doxycycline.  I told him that we could be very confident that the doxycycline eradicated the Lyme bacteria.   He should be  cured.     Obstructive sleep apnea, successfully using oral appliance, sleep apnea has been positional in nature, better when he sleeps on his side.  He believes that the oral appliance has corrected the sleep apnea component that was disrupting his sleep.  Now it disrupts his sleep is the nighttime urination.     Benign prostatic hyperplasia with mildly enlarged gland on physical exam, mildly elevated PSAs observed for at least 4 years, started on finasteride September 2020   intolerance to tamsulosin which made him lightheaded  Lab Results   Component Value Date    PSA 4.3 (H) 09/16/2020    PSA 4.9 (H) 02/22/2019    PSA 5.5 (H) 12/04/2017      Chronic low back pain with right sciatica (tingling right big toe) with lumbar degenerative disc disease seen on MRI scan most recently July 2014, needs to implement a back and core muscle strengthening program.  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       History of colon polyp seen in 2013, subsequent colonoscopy February 2018 normal, needs recheck 5 years later which would be 2023.      History of altitude sickness for which he uses preventive acetazolamide.      Occasional wheezing uses albuterol for that.       Seasonal nasal allergies uses Claritin.      Could consider getting recombinant shingles vaccine.

## 2021-06-14 NOTE — TELEPHONE ENCOUNTER
Refill request for medication: finasteride 5 mg  Last visit addressing this medication: 12/09/2020  Follow up plan unknown  months  Last refill on 10/05/2020, quantity #30   CSA completed Not applicable   checked Not applicable    Appointment: None     Asia Sanchez CMA

## 2021-06-14 NOTE — PROGRESS NOTES
[facility] Follow Up Note    Jermaine Hoyos   57 y.o. male    Date of Visit: 12/8/2017    Chief Complaint   Patient presents with     Establish Care     needs Rx, discuss cholesterol med, groin pain, back pain, PSA, slow urination, hot feet     Subjective  Patient goes by Lc.  Here for follow-up of multiple medical issues and question.  He will get his physical exam in 6 months.    He has a past history of hypercholesterolemia with intolerance to statins in the past with significant myalgias and zombielike memory issues with Crestor and Lipitor.  He was tolerating simvastatin at 20 mg about every other day, but ran out earlier this year.  He came in earlier this month for cholesterol check with an , HDL 38 and triglycerides 189.  Is a family history of hypercholesterolemia with his mother.  No early family history of very artery disease.    Patient had a stress test about 7 years ago in [place] but that was negative.  Her graph is fairly active with walking the dog daily and walks up 5 flights of stairs at work on a regular basis.  He took some time off from the regular walking exercise this summer, just doing yard work.  He went back to doing the stairs at work this fall he did notice some deconditioning needing more minutes for recovery shortness of breath.  He denies any chest pain or chest pressure or neck pain.  No lower extremity edema.  No shortness of breath at rest.  No palpitations for occasional premature beats in the past.  No history of sustained arrhythmia.    He was diagnosed with some sleep disordered breathing and snoring in the past was told he did not need his CPAP.  When he sleeps on his side or stomach is not a problem.  Is cut back on alcohol but does drink 1-2 drinks in the evening fairly regularly.    Never smoked.  He is trying to eat a healthy Mediterranean type diet.  His blood pressure is always been normal.  His weight is stable, thin.    American  Heart Association heart risk calculator was 7.2% 10 year risk for heart disease.    Patient has some mild to moderate BPH symptoms that developed over the last 1-2 years.  He has had an increase of his PSA to the 5-6 range, but is been stable over the past year.  Was 5.5 this month and April 2016 was 5.8.  Is 1-2 times a night nocturia.  No hematuria or UTI history.  Some increased urgency.  Tolerable and he does not feel he needs to see urology at this time.  No family history of prostate cancer.    He has had some mild increased bowel urgency, but otherwise bowels are normal.  No blood in stool or diarrhea.  No family history of colon cancer.  He had a colonoscopy February 2013 that did show a polyp.  5 year follow-up due next February.  No abdominal pain complaints now.    He has had some intermittent lower back pain but that is well-controlled with regular stretching.  He has some spondylosis and herniated disc fragment on the right S1 back in 2014 but he did not need surgical intervention.  He stretches daily does fairly well, no radicular symptoms now.    He is going skiing in [place] and usually takes Diamox.  He does have a history of altitude sickness in the past, but he has successfully prevented that with Diamox in the past.  He is requesting a refill.    Past history of squamous cell cancer of the face, saw dermatology earlier this spring and no recurrence.  No new skin lesions.  She is dermatologist yearly in the spring.    No history of asthma, no chronic sinusitis symptoms.  No cough complaints today.    No vision changes or headache issues.  Is been about 1-2 years since he seen the eye doctor.    Occasional heartburn, not treating    He has a son, lives with daughter and wife    PMHx:  No past medical history on file.  PSHx:  No past surgical history on file.  Immunizations:   Immunization History   Administered Date(s) Administered     Hep A / Hep B 12/17/2008, 01/15/2009, 06/25/2009     Influenza  "[id number], 02/02/2010     Influenza, inj, historic,unspecified 09/22/2009, 10/10/2010, 10/01/2011, 10/01/2012     Influenza, seasonal,quad inj 36+ mos 09/19/2016     Td,adult,historic,unspecified 03/27/1995, 03/13/2008     Tdap 02/04/2013       ROS A comprehensive review of systems was performed and was otherwise negative    Medications, allergies, and problem list were reviewed and updated    Exam  /78  Pulse 83  Ht 6' 0.5\" (1.842 m)  Wt 185 lb (83.9 kg)  SpO2 98%  BMI 24.75 kg/m2  Healthy, thin appearing male who appears in good health.  Alert and oriented ×3 with normal mood and affect.  Pupils and irises equal and reactive.  Extraocular muscles intact.  No jaundice or conjunctivitis.  External ears nose exam is normal.  Pharynx is normal and not crowded.  Teeth in good condition.  No cervical or supraclavicular adenopathy.  No JVD and no carotid bruits.  Lungs are clear to auscultation with normal respiratory excursion.  Spine is straight.  Heart is regular without murmur.  Abdomen is thin, nontender no hepatosplenomegaly and no pulsatile mass.  No ankle edema.  +1 pedal pulses bilaterally and feet in good condition.  Gait normal.    Assessment/Plan  1. Hypercholesterolemia  I did explain to patient that he is low enough cardiac risk that he does not need to take a statin.  Patient does feel that he wants to take a statin and accepts the toxicity risk of the medications.  I discussed toxicity risk in detail including the cognitive effects, liver and muscle toxicity risk including other risks.  Patient accepts these risks and wishes to restart his simvastatin.  He does plan to use at a lower dose, at least initially, returning to his 20 mg every other day regimen.  He will follow-up in the spring for his physical for cholesterol recheck.  - simvastatin (ZOCOR) 40 MG tablet; Take 1 tablet (40 mg total) by mouth at bedtime.  Dispense: 90 tablet; Refill: 3    I did discuss option to do screening cardiac CT " scan or other screening test to further risk stratify, he declined at this time.    Mild shortness of breath with exertion when climbing stairs, but denies chest pain.  I did discuss option for cardiac stress test, he does not wish to proceed with that at this time.  He will continue with regular exercise, anticipate conditioning to tolerate that level of exercise over the next 1-2 months.  If he has worsening symptoms, he was told to seek medical attention immediately.    2. Screen for colon cancer  Referral for colonoscopy place.  5 year colonoscopy after history of polyp February 2013.  He does not take an aspirin a day.  - Ambulatory referral for Colonoscopy  - Ambulatory referral for Colonoscopy    3. History of skin cancer  Spring yearly follow-up    4. Elevated PSA  Mild BPH symptoms with 1-2 times a night nocturia and mild urgency.  Follow-up if symptoms worsen, otherwise recheck prostate in the spring it is visible and recheck PSA for diagnostic check.    5. Patient travels  Refill given  - acetaZOLAMIDE (DIAMOX) 125 MG tablet; Take 1 pill 3 times a day one day prior to ascent and continue while at higher altitude.  Dispense: 15 tablet; Refill: 1    History of lower back pain with spondylolisthesis previous disc herniation, no radicular symptoms now.  Continue regular walking and stretching.    We will be due for his 2 year eye exam next spring.    He is [employer] Daniel had the flu shot at work.    Return in about 6 months (around 6/8/2018) for Annual physical.   Patient Instructions   Refill of simvastatin 40 mg tablets was sent to pharmacy, but you will plan to start taking at one half tablet a day or every other day.    Diamox prescription sent to pharmacy.    Follow-up with me in approximately 6 months for your physical.    [place] [facility] will contact you for your 5 year colonoscopy that should be scheduled in February.    You could consider 2 scoops of Metamucil or Citrucel with glass of water daily  to help improve the regularity and reduce urgency with your bowels.    Seek medical attention immediately if any increasing shortness of breath or chest discomfort develops.    Nehemiah Otto MD  Total time with patient over 40 minutes and over 50% coord care.  Time all face to face.      Current Outpatient Prescriptions   Medication Sig Dispense Refill     acetaZOLAMIDE (DIAMOX) 125 MG tablet Take 1 tablet twice daily, beginning one day prior to travel take for 2 more days. 6 tablet 0     acetaZOLAMIDE (DIAMOX) 125 MG tablet Take 1 pill 3 times a day one day prior to ascent and continue while at higher altitude. 15 tablet 1     simvastatin (ZOCOR) 40 MG tablet Take 1 tablet (40 mg total) by mouth at bedtime. 90 tablet 3     No current facility-administered medications for this visit.      Allergies   Allergen Reactions     Atorvastatin      Memory loss, joint aches     Ibuprofen Itching     Palms, itching     Naproxen Sodium Itching     palms     Rosuvastatin      Memory loss, muscle aches     Social History   Substance Use Topics     Smoking status: Never Smoker     Smokeless tobacco: None     Alcohol use 3.0 - 6.0 oz/week     5 - 10 Cans of beer per week

## 2021-06-16 PROBLEM — J30.9 ALLERGIC RHINITIS: Status: ACTIVE | Noted: 2020-09-09

## 2021-06-16 PROBLEM — I25.10 CORONARY ARTERY CALCIFICATION SEEN ON CT SCAN: Status: ACTIVE | Noted: 2020-11-30

## 2021-06-16 PROBLEM — Z29.89 ALTITUDE SICKNESS PREVENTATIVE MEASURES: Status: ACTIVE | Noted: 2020-09-09

## 2021-06-16 PROBLEM — Z86.19 HISTORY OF LYME DISEASE: Status: ACTIVE | Noted: 2020-09-09

## 2021-06-16 PROBLEM — M54.50 LOW BACK PAIN: Status: ACTIVE | Noted: 2019-02-05

## 2021-06-16 PROBLEM — N40.0 BPH (BENIGN PROSTATIC HYPERPLASIA): Status: ACTIVE | Noted: 2019-02-05

## 2021-06-18 NOTE — PROGRESS NOTES
Chief Complaint   Patient presents with     poss Eye irritation     x 1days right eye is very red          HPI    Patient is here for right eye redness noted by his daughter this AM. He had a bowel movement before the eye redness started that required some straining. On Friday he said his right eye was hit by a tree branch but no pain nor foreign body sensation after that incident. Today he denied eye pain, photophobia, foreign body sensation, visual changes. NO fever, cough, nasal congestion.    ROS: Pertinent ROS noted in HPI.     Allergies   Allergen Reactions     Atorvastatin      Memory loss, joint aches     Ibuprofen Itching     Palms, itching     Naproxen Sodium Itching     palms     Rosuvastatin      Memory loss, muscle aches       Patient Active Problem List   Diagnosis     Squamous Cell Carcinoma Of The Skin Of The Face     Obstructive Sleep Apnea     Benign Adenomatosis Of The Large Intestine     Hyperlipidemia     Elevated PSA       Family History   Problem Relation Age of Onset     Memory loss Mother      Hyperlipidemia Mother      Benign prostatic hyperplasia Father        Social History     Social History     Marital status:      Spouse name: N/A     Number of children: N/A     Years of education: N/A     Occupational History           Social History Main Topics     Smoking status: Never Smoker     Smokeless tobacco: Never Used     Alcohol use 3.0 - 6.0 oz/week     5 - 10 Cans of beer per week     Drug use: Not on file     Sexual activity: Not on file     Other Topics Concern     Not on file     Social History Narrative    Diet- He has been trying a Mediterranean-like diet        Exercise- Walks his dog for 1 to 2 miles 4 days/ week. Walks at work 1 to 2 times a week.         Objective:    Vitals:    05/28/18 1000   BP: 130/64   Pulse: 96   Temp: 97  F (36.1  C)   SpO2: 97%       Gen:NAD  Eyes: subconjunctival hemorrhage at medial half of right conjunctiva. Normal left conjunctiva.  Corneas grossly clear. No hyphema. Negative Fluorescein stain exam of right eye. Eyelids normal without lesions bilaterally. MAMI FELIX.    Impression:    Subconjunctival hemorrhage, right      Plan:    Advised OTC artificial tears for comfort  Follow up with Ophthalmology if symptoms escalate or fail to improve as discussed (patient has his own Ophthalmologist that he can f/u with).

## 2021-06-18 NOTE — LETTER
Letter by Edinson Morales MD at      Author: Edinson Morales MD Service: -- Author Type: --    Filed:  Encounter Date: 2/22/2019 Status: (Other)       Parent/guardian of Jermaine Hoyos  [address] [facility] [place] [address]             February 22, 2019         Dear Jermaine Hoyos,    Below are the results from Jermaine's recent visit: Sugars good at 94, no diabetes.  Liver and kidney tests are normal.  Cholesterol is slightly elevated but focus on good diet and exercising would not attempt to try another cholesterol medication.  PSA is elevated but actually lower than it has been recently, I would continue to check yearly and do watchful waiting.    Resulted Orders   Comprehensive Metabolic Panel   Result Value Ref Range    Sodium 142 136 - 145 mmol/L    Potassium 4.6 3.5 - 5.0 mmol/L    Chloride 107 98 - 107 mmol/L    CO2 26 22 - 31 mmol/L    Anion Gap, Calculation 9 5 - 18 mmol/L    Glucose 94 70 - 125 mg/dL    BUN 9 8 - 22 mg/dL    Creatinine 1.02 0.70 - 1.30 mg/dL    GFR MDRD Af Amer >60 >60 mL/min/1.73m2    GFR MDRD Non Af Amer >60 >60 mL/min/1.73m2    Bilirubin, Total 0.6 0.0 - 1.0 mg/dL    Calcium 9.5 8.5 - 10.5 mg/dL    Protein, Total 7.0 6.0 - 8.0 g/dL    Albumin 4.1 3.5 - 5.0 g/dL    Alkaline Phosphatase 75 45 - 120 U/L    AST 23 0 - 40 U/L    ALT 27 0 - 45 U/L    Narrative    Fasting Glucose reference range is 70-99 mg/dL per  American Diabetes Association (ADA) guidelines.   Lipid Cascade   Result Value Ref Range    Cholesterol 231 (H) <=199 mg/dL    Triglycerides 231 (H) <=149 mg/dL    HDL Cholesterol 43 >=40 mg/dL    LDL Calculated 142 (H) <=129 mg/dL    Patient Fasting > 8hrs? Yes    PSA (Prostatic-Specific Antigen), Annual Screen   Result Value Ref Range    PSA 4.9 (H) 0.0 - 3.5 ng/mL    Narrative    Method is Abbott Prostate-Specific Antigen (PSA)  Standard-WHO 1st International (90:10)            Please call with questions or contact us using [employer].    Sincerely,        Electronically  signed by Edinson Morales MD

## 2021-06-20 NOTE — LETTER
Letter by Carmen Guzmán RN at      Author: Carmen Guzmán RN Service: -- Author Type: --    Filed:  Encounter Date: 7/24/2020 Status: (Other)       7/24/2020        Jermaine Hoyos  [address] [facility] [place] [address]    This letter provides a written record that you were tested for COVID-19 on 7/21/2020.     Your result was negative. This means that we didnt find the virus that causes COVID-19 in your sample. A test may show negative when you do actually have the virus. This can happen when the virus is in the early stages of infection, before you feel illness symptoms.    If you have symptoms   Stay home and away from others (self-isolate) until you meet ALL of the guidelines below:    Youve had no fever--and no medicine that reduces fever--for 3 full days (72 hours). And ?    Your other symptoms have gotten better. For example, your cough or breathing has improved. And?    At least 10 days have passed since your symptoms started.    During this time:    Stay home. Dont go to work, school or anywhere else.     Stay in your own room, including for meals. Use your own bathroom if you can.    Stay away from others in your home. No hugging, kissing or shaking hands. No visitors.    Clean high touch surfaces often (doorknobs, counters, handles, etc.). Use a household cleaning spray or wipes. You can find a full list on the EPA website at www.epa.gov/pesticide-registration/list-n-disinfectants-use-against-sars-cov-2.    Cover your mouth and nose with a mask, tissue or washcloth to avoid spreading germs.    Wash your hands and face often with soap and water.    Going back to work  Check with your employer for any guidelines to follow for going back to work.    Employers: This document serves as formal notice that your employee tested negative for COVID-19, as of the testing date shown above.

## 2021-06-23 NOTE — PATIENT INSTRUCTIONS - HE
I will mail laboratory results.  My plan is to not restart a cholesterol medication rather heavy focus on good diet and exercise.  See us again in 1 year.

## 2021-06-26 NOTE — PROGRESS NOTES
Progress Notes by Karin Damon CNP at 5/17/2018  7:20 PM     Author: Karin Damon CNP Service: -- Author Type: Nurse Practitioner    Filed: 5/17/2018  8:13 PM Encounter Date: 5/17/2018 Status: Signed    : Karin Damon CNP (Nurse Practitioner)       ASSESSMENT:   1. Tick bite, initial encounter  Lyme Antibody Cascade    doxycycline (VIBRA-TABS) 100 MG tablet       PLAN:  58-year-old male presents for evaluation of a tick bite.  He found a tick on his back earlier today, unsure how long it has been there but he thinks for around 3-4 days.  Did have 2 days of low-grade fever, body aches earlier this week however he also had URI symptoms at that time.  Patient does request a Lyme screen as he has been in the woods for the past 4 weeks.  At this point, he is given 1 prophylactic dose of doxycycline, Lyme cascade is ordered.  Patient can be contacted with these results once they are available and treatment with doxycycline can be initiated at that time showed that return is positive.  I did discuss this with the patient, and if screen returns negative and patient has no further symptoms no further medications will be necessary.  Patient is amenable with this plan.    I discussed red flag symptoms, return precautions to clinic/ER and follow up care with patient/parent.  Expected clinical course, symptomatic cares advised. Questions answered. Patient/parent amenable with plan.    Patient Instructions:  Patient Instructions     Please keep an eye out for a bullseye type rash, fever, generalized fatigue, body aches.  If these develop, please return to clinic.  I have sent in a one time dose of antibiotic to help prevent Lyme disease.  The Lyme test will take several days, and we will call you with the results. If this is negative and you do not develop symptoms, there is no further treatment needed.      Tick Bites  Ticks are small arachnids that feed on the blood of rodents, rabbits, birds, deer,  "dogs, and people. A tick bite may cause a reaction like a spider bite. You may have redness, itching, and slight swelling at the site. Sometimes you may have no reaction where the tick bit you.  Ticks may gorge themselves for days before you find and remove them. The bites themselves aren't cause for concern. But ticks can carry and pass on illnesses such as Lyme disease and [place] spotted fever. Both diseases begin with a rash and symptoms similar to the flu. In advanced stages, these diseases can be quite serious.     A \"bull's eye\" rash is a common symptom of Lyme disease.   When to go to the emergency room (ER)  Not all ticks carry disease. And a tick must stay attached for at least 24 hours to infect you. If you find a tick, don't panic. Try to carefully remove it with tweezers. Grasp the tick near its head and pull without twisting. If you can't easily dislodge the tick or if you leave the head in your skin, get medical care right away.  What to expect in the ER    The tick or any parts of the tick will be removed and the bite will be cleaned.    To prevent disease, you may be given antibiotics. Both Lyme disease and [place] spotted fever respond quickly to these medicines.    You may be asked to see your healthcare provider for a blood test to check for Lyme disease.  Follow-up care  Some states and counties have services that test ticks for Lyme disease and other diseases. Check with your local officials to see if this service is available in your area.  If you remove a tick yourself, watch for signs of a tick-borne illness. Symptoms may show up within a few days or weeks after a bite. Call your healthcare provider if you notice any of the following:    Rash. The rash may spread outward in a ring from a hard white lump. Or it may move up your arms and legs to your chest.    Chills and fever    Body aches and joint pain    Severe headache  Date Last Reviewed: 12/1/2016 2000-2017 [employer] [address] [place] [address], [place] [address]. All rights reserved. This information is not intended as a substitute for professional medical care. Always follow your healthcare professional's instructions.            SUBJECTIVE:   Jermaine Hoyos is a 58 y.o. male who presents today after finding a tick attached to his back.  He is unsure how long it has been attached.  He has been in wooded areas with ticks seen for the past 4 weekends.  2 days ago, he had temp of 99.5, body aches, however also had congestion, runny nose, sore throat.  Those symptoms resolved, now expresses concern as he found tick.  Does appear to be a deer tick.        ROS:  Comprehensive 12 pt ROS completed, positives noted in HPI, otherwise negative.      Past Medical History:  Patient Active Problem List   Diagnosis   ? Squamous Cell Carcinoma Of The Skin Of The Face   ? Obstructive Sleep Apnea   ? Benign Adenomatosis Of The Large Intestine   ? Hyperlipidemia   ? Elevated PSA       Surgical History:  No past surgical history on file.        Family History:  Family History   Problem Relation Age of Onset   ? Memory loss Mother    ? Hyperlipidemia Mother    ? Benign prostatic hyperplasia Father        Reviewed; Non-contributory    History   Smoking Status   ? Never Smoker   Smokeless Tobacco   ? Never Used         Current Medications:  Current Outpatient Prescriptions on File Prior to Visit   Medication Sig Dispense Refill   ? acetaZOLAMIDE (DIAMOX) 125 MG tablet Take 1 tablet twice daily, beginning one day prior to travel take for 2 more days. 6 tablet 0   ? acetaZOLAMIDE (DIAMOX) 125 MG tablet Take 1 pill 3 times a day one day prior to ascent and continue while at higher altitude. 15 tablet 1   ? simvastatin (ZOCOR) 40 MG tablet Take 1 tablet (40 mg total) by mouth at bedtime. 90 tablet 3     No current facility-administered medications on file prior to visit.        Allergies:   Allergies   Allergen Reactions   ? Atorvastatin       Memory loss, joint aches   ? Ibuprofen Itching     Palms, itching   ? Naproxen Sodium Itching     palms   ? Rosuvastatin      Memory loss, muscle aches       OBJECTIVE:   Vitals:    05/17/18 1938   BP: 130/63   Pulse: 75   Resp: 15   Temp: 98  F (36.7  C)   TempSrc: Oral   SpO2: 95%   Weight: 193 lb (87.5 kg)     Physical exam reveals a pleasant 58 y.o. male.   Appears healthy, alert and cooperative. Non-toxic appearance.  Lungs: even and unlabored resp.  Heart: regular rate   Skin: pink, warm, dry. 2cm erythematous round lesion surrounding site of tick bite. No drainage.      RADIOLOGY    none  LABORATORY STUDIES    none      Karin Damon, CNP

## 2021-06-27 NOTE — PROGRESS NOTES
Progress Notes by Edinson Morales MD at 2/5/2019  8:10 AM     Author: Edinson Morales MD Service: -- Author Type: Physician    Filed: 2/5/2019 10:35 AM Encounter Date: 2/5/2019 Status: Signed    : Edinson Morales MD (Physician)       MALE PREVENTATIVE EXAM    Assessment and Plan:       1. Physical exam  Patient overall has very good health habits.    2. Hyperlipidemia  Elevation of LDL, however he has not tolerated 2 prior statins.  - Comprehensive Metabolic Panel; Future  - Lipid Cascade; Future    3. Obstructive Sleep Apnea  Not on CPAP, asymptomatic.    4. Benign neoplasm of colon  From prior colonoscopy.    5. Elevated PSA  Several years or longer history of an elevated PSA has been stable.  - PSA (Prostatic-Specific Antigen), Annual Screen; Future    6. Squamous Cell Carcinoma Of The Skin Of The Face  Followed by dermatology.    7. Benign prostatic hyperplasia   Nocturia    8. Chronic low back  Conservative management.    PLAN:  1.  In terms of the BPH for now watchful waiting.  2.  Ongoing conservative measures for his low back pain.  3.  Patient is followed by dermatology regularly.  4.  Laboratory studies as above.  5.  If the PSA is stable we can continue watchful waiting, if there is a dramatic change or elevation then would consider urology referral.  6.  In terms of the elevated cholesterol, the patient is going to attempt lifestyle modification first were going to hold off on statins at this time.  7.  Patient should otherwise be seen yearly.        Next follow up:  Return in about 1 year (around 2/5/2020) for Annual physical.    Immunization Review  Adult Imm Review: No immunizations due today  Documented tobacco use.  Website and phone contact for [employer]uitPlan given to patient in AVS.    I discussed the following with the patient:   Adult Healthy Living: Importance of regular exercise  Healthy nutrition    I have had an Advance Directives discussion with the patient.    Subjective:   Chief  Complaint: Jermaine Hoyos is an 59 y.o. male here for a preventative health visit.     HPI:  Patient states that he gets up at least once a night to urinate and sometimes up to 2-3 times. If he drinks a lot of water then he will also urinate frequently during the day. He notes that when he goes he usually produces a lot of urine. He mentions that his PSA has been gradually increasing for the past ten years or so. He stays active by going on walks, skiing, and hiking. He manages his sleep apnea by sleeping on his side. He has seasonal allergies which have gotten worse as he has gotten older. He has tinnitus which has been stable for years. He has chronic low back pain and has some tingling in his toes. He has right leg sciatica. He does stretching which provides some relief.     PFSH:   Family: His maternal grandfather may have had prostate surgery. His father has an enlarged prostate and had a reduction surgery. His father had a TIA. His mother has some memory issues.     Healthy Habits  Are you taking a daily aspirin? No  Do you typically exercising at least 40 min, 3-4 times per week?  NO  Do you usually eat at least 4 servings of fruit and vegetables a day, include whole grains and fiber and avoid regularly eating high fat foods? Yes  Have you had an eye exam in the past two years? Yes  Do you see a dentist twice per year? Yes  Do you have any concerns regarding sleep? YES    Safety Screen  If you own firearms, are they secured in a locked gun cabinet or with trigger locks? Yes  Do you feel you are safe where you are living?: Yes (2/5/2019  8:01 AM)  Do you feel you are safe in your relationship(s)?: Yes (2/5/2019  8:01 AM)      Review of Systems:  Please see above.  The rest of the review of systems are negative for all systems.     Cancer Screening       Status Date      COLONOSCOPY Next Due 2/13/2023      Done 2/13/2018 COLONOSCOPY EXTERNAL RESULT     Patient has more history with this topic...           Patient Care Team:  Edinson Morales MD as PCP - General (Family Medicine)        History     Reviewed By Date/Time Sections Reviewed    Edinson Morales MD 2/5/2019  8:21 AM Tobacco, Alcohol, Drug Use, Sexual Activity, Family, Social Documentation, Socioeconomic, Lifestyle, Relationships    Edinson Morales MD 2/5/2019  8:20 AM Medical, Surgical    Jonelle Amezquita [facility] 2/5/2019  8:03 AM Tobacco            Objective:   Vital Signs:   Visit Vitals  /77   Pulse 67   Ht 6' (1.829 m)   Wt 182 lb 6.4 oz (82.7 kg)   SpO2 97%   BMI 24.74 kg/m           PHYSICAL EXAM  Constitutional:   Reveals a male who appears healthy.  Vitals: per nursing notes.  HEENT: Right Ear: External ear normal.   Left Ear: External ear normal.   Nose: Nose normal.   Mouth/Throat: Oropharynx is clear and moist.   Eyes: Conjunctivae and EOM are normal. Pupils are equal, round, and reactive to light. Right eye exhibits no discharge. Left eye exhibits no discharge.   Neck:  Supple, no carotid bruits or adenopathy.  Back:  No spine or CVA pain.  Thorax:  No bony deformities.  Lungs: Clear to A&P without rales or wheezes.  Respiratory effort normal.  Cardiac:   Regular rate and rhythm, normal S1, S2, no murmur or gallop.  Abdomen:  Soft, active bowel sounds without bruits, mass, or tenderness.  Genitourinary: Prostate not enlarged, symmetric.  Extremities:   No peripheral edema, pulses in the feet intact.    Skin:  No jaundice, peripheral cyanosis or lesions to suggest malignancy.  Neuro:  Alert and oriented. Cranial nerves, motor, sensory exams are intact.  No gross focal deficits.  Psychiatric:  Memory intact, mood appropriate.    ADDITIONAL HISTORY SUMMARIZED (2): None.  DECISION TO OBTAIN EXTRA INFORMATION (1): None.   RADIOLOGY TESTS (1): None.  LABS (1): Ordered future labs. Reviewed labs from 12/04/17: PSA: 5.5.  MEDICINE TESTS (1): None.  INDEPENDENT REVIEW (2 each): None.     Total data points = 1    Total time was 44 minutes,  greater than 50% counseling and coordinating care regarding the above issues.        The 10-year ASCVD risk score (Teresitarebekah MERIDA Jr., et al., 2013) is: 8.8%    Values used to calculate the score:      Age: 59 years      Sex: Male      Is Non- : No      Diabetic: No      Tobacco smoker: No      Systolic Blood Pressure: 114 mmHg      Is BP treated: No      HDL Cholesterol: 38 mg/dL      Total Cholesterol: 253 mg/dL         Medication List           Accurate as of 2/5/19  9:14 AM. If you have any questions, ask your nurse or doctor.               CONTINUE taking these medications    acetaZOLAMIDE 125 MG tablet  Also known as:  DIAMOX  INSTRUCTIONS:  Take 1 pill 3 times a day one day prior to ascent and continue while at higher altitude.        simvastatin 40 MG tablet  Also known as:  ZOCOR  INSTRUCTIONS:  Take 1 tablet (40 mg total) by mouth at bedtime.               Additional Screenings Completed Today:     By signing my name below, Yousuf MURDOCK, attest that this documentation has been prepared under the direction and in the presence of Dr. Edinson Morales.  Electronic Signature: Argelia Carey. 2/05/2019 8:18 AM.    IDr. Morales, personally performed the services described in this documentation. All medical record entries made by the scribe were at my direction and in my presence. I have reviewed the chart and discharge instructions (if applicable) and agree that the record reflects my personal performance and is accurate and complete.

## 2021-07-05 PROBLEM — Z78.9 STATIN INTOLERANCE: Status: ACTIVE | Noted: 2021-06-23

## 2021-07-05 PROBLEM — S79.811A: Status: ACTIVE | Noted: 2021-06-23

## 2021-09-11 ENCOUNTER — HEALTH MAINTENANCE LETTER (OUTPATIENT)
Age: 61
End: 2021-09-11

## 2021-10-30 DIAGNOSIS — N40.1 BENIGN PROSTATIC HYPERPLASIA WITH NOCTURIA: ICD-10-CM

## 2021-10-30 DIAGNOSIS — R35.1 BENIGN PROSTATIC HYPERPLASIA WITH NOCTURIA: ICD-10-CM

## 2021-10-31 RX ORDER — FINASTERIDE 5 MG/1
TABLET, FILM COATED ORAL
Qty: 90 TABLET | Refills: 1 | Status: SHIPPED | OUTPATIENT
Start: 2021-10-31 | End: 2022-03-29

## 2021-10-31 NOTE — TELEPHONE ENCOUNTER
"Last Written Prescription Date:  1/5/2021  Last Fill Quantity: 90,  # refills: 2   Last office visit provider:  6/23/2021 Dr. Alexis     finasteride (PROSCAR) 5 mg tablet 90 tablet 2 1/5/2021  No   Sig - Route: Take 1 tablet (5 mg total) by mouth daily. - Oral   Sent to pharmacy as: finasteride 5 mg tablet (PROSCAR)   E-Prescribing Status: Receipt confirmed by pharmacy (1/5/2021  2:53 PM CST         Requested Prescriptions   Pending Prescriptions Disp Refills     finasteride (PROSCAR) 5 MG tablet [Pharmacy Med Name: FINASTERIDE 5 MG TABLET] 90 tablet 2     Sig: TAKE 1 TABLET BY MOUTH EVERY DAY       BPH Agents Passed - 10/30/2021  7:22 AM        Passed - Recent (12 mo) or future (30 days) visit within the authorizing provider's department     Patient has had an office visit with the authorizing provider or a provider within the authorizing providers department within the previous 12 mos or has a future within next 30 days. See \"Patient Info\" tab in inbasket, or \"Choose Columns\" in Meds & Orders section of the refill encounter.              Passed - Medication is active on med list        Passed - Patient is 18 years of age or older             Genny Walker RN 10/31/21 6:01 PM  "

## 2021-12-03 ENCOUNTER — OFFICE VISIT (OUTPATIENT)
Dept: FAMILY MEDICINE | Facility: CLINIC | Age: 61
End: 2021-12-03
Payer: COMMERCIAL

## 2021-12-03 VITALS
WEIGHT: 195.5 LBS | BODY MASS INDEX: 26.33 KG/M2 | DIASTOLIC BLOOD PRESSURE: 64 MMHG | HEART RATE: 68 BPM | SYSTOLIC BLOOD PRESSURE: 124 MMHG

## 2021-12-03 DIAGNOSIS — N40.1 BENIGN PROSTATIC HYPERPLASIA WITH NOCTURIA: ICD-10-CM

## 2021-12-03 DIAGNOSIS — Z23 HIGH PRIORITY FOR 2019-NCOV VACCINE: ICD-10-CM

## 2021-12-03 DIAGNOSIS — M79.89 LOCALIZED SWELLING OF BOTH LOWER EXTREMITIES: Primary | ICD-10-CM

## 2021-12-03 DIAGNOSIS — R35.1 BENIGN PROSTATIC HYPERPLASIA WITH NOCTURIA: ICD-10-CM

## 2021-12-03 PROCEDURE — 0064A COVID-19,PF,MODERNA (18+ YRS BOOSTER .25ML): CPT | Performed by: NURSE PRACTITIONER

## 2021-12-03 PROCEDURE — 99213 OFFICE O/P EST LOW 20 MIN: CPT | Performed by: NURSE PRACTITIONER

## 2021-12-03 PROCEDURE — 91306 COVID-19,PF,MODERNA (18+ YRS BOOSTER .25ML): CPT | Performed by: NURSE PRACTITIONER

## 2021-12-03 NOTE — PATIENT INSTRUCTIONS
Please consider using some compression stockings during the day.    Elevate the legs.    Recommend a low sodium diet.    Restart Finasteride and monitor symptoms.    Follow up with Dr. Alexis in the next month      Patient Education   Edema in the Lower Body  What is edema?  Edema is swelling caused by the build-up of fluid in the body tissues. This fluid, called lymph fluid, bathes all the cells in the body.   Normally, the lymph system (a network of nodes and vessels) removes any extra fluid and returns it to the bloodstream. But when part of the lymph system is damaged or doesn t work right, the fluid is not removed. Instead, it keeps building up until swelling occurs.   Swelling often appears in the arms, legs, face, neck or trunk. There is no cure for it, but it can be controlled.   There are two kinds of edema:   Primary edema is caused by a problem in the lymph system that is present at birth. The swelling may exist at birth or develop later in life. It is more common in the legs than in the arms. It occurs more often in females.  Secondary edema results from a known injury to the lymph system. It may be due to surgery, injury, scarring, chronic vein problems, radiation treatment, obesity (being very overweight) or repeated infections. Cancer patients who have their lymph nodes removed or radiated are at higher risk for edema. It may occur right after cancer therapy or many years later.  Your doctor may think you re at risk for edema, or you may already have it. In either case, you should ask to meet with a certified edema therapist.   What are the symptoms?  Early symptoms may be subtle: You may have a heavy, tight or full feeling in a limb, the genital area or your abdomen (belly area), even though there is no visible swelling. Swelling may appear for a short time at the end of the day and go away by morning. The limb may tire more easily and feel achy.   Symptoms tend to get worse without treatment. The skin  and soft tissues of the limb will lose their elasticity (ability to stretch) and may harden (fibrosis). Your chances of getting skin infections, such as cellulitis, increase.   If it is not treated, edema progresses in three stages:    Stage 1: Swelling goes away after you rest with your leg raised for several hours.    Stage 2: Swelling does not go away after raising your leg for several hours. The skin on your legs and lower body gets harder. You may have skin infections, increasing the risk that you will move to Stage 3.    Stage 3: Extreme swelling, or  elephantiasis, may occur. The skin becomes thicker and may have wart-like bumps (called papillomas).  How can I prevent or control edema?  You may prevent edema by following the guidelines below. If you already have it, these steps may keep your swelling from getting worse. But they cannot replace treatment by a certified therapist.   Take care of your skin   The extra fluid in your leg and lower body is a site where germs can easily multiply. Try to avoid any breaks in the skin such as cuts, insect bites, burns or scratches. A break in the skin can let germs into the body and put the swollen body part at risk for infection.     If skin breaks do occur, you will need to treat them quickly. Always wash cuts or injuries right away. You may carry an anti-bacterial cream (Neosporin, Polysporin, bacitracin) and some Band-Aids to cover any wounds. Call your doctor if you see signs of infection: redness, heat, fever or sudden increased swelling.    Keep the limb and lower body clean and dry.    Check between your toes for cracks or blisters. You may want to use anti-fungal powder or cream as needed.    After washing, gently dry your skin well, especially between the toes.    Clip your toenails carefully. Do not cut the cuticles.    Take extra care when shaving.    Protect skin with sunscreen (at least 30 SPF) and insect repellent.    Use scent-free, color-free soap and  lotion, such as Naomy, Curel, Eucerin or Lubriderm. (Do not put lotion between your toes.) Put the lotion on right after bathing. Use it more often if you notice any dryness. Daily lotion will help prevent chapping or chaffing of the skin.    If possible, do not allow shots, blood tests, skin tests, tattoos, body piercings, acupuncture or other procedures on your leg or lower trunk.    Wear protective clothing for sports, yard work and cleaning.    If you have an itch, do not scratch your skin. Instead, gently rub over the area with a cold, wet washcloth.    Sexual activity may cause yeast or bladder infections in women. Any slight injury to this area may cause or increase swelling.    Call your doctor at once if you notice any redness, pain, heat or swelling in your leg, groin or belly (even in small amounts), or if you have a fever.  Avoid extreme hot or cold  Heat will cause blood vessels to dilate (grow wider), which increases the amount of fluid in the tissues. Extreme cold will cause vessels to constrict (become narrow), which decreases the flow of fluid. Either can make swelling worse, though heat is often worse than cold.    Avoid long, hot showers and baths. Stay away from saunas and hot tubs. Water above 102 F (38.9 C) may make swelling worse.    Avoid exposing your limb to extreme cold (for example, an ice pack). This may cause rebound swelling and chapped skin.    Exercise safely    Do moderate exercise, but don t get overheated or too sweaty. Be sure to wear your bandages or compression garments during exercise, if you have them.    Try to stay at a healthy body weight.    During and after exercise, check for any changes in how your limb looks or feels. If your limb begins to ache, raise it. You may want to place moist, cool towels on the limb.    When first starting any new exercise, take it easy. Slowly increase the amount of time and energy you put into it.    Rest often during physical activity.  This will give your limb time to recover.  Don t restrict the flow of lymph fluid  If you restrict the flow of the lymph fluid, your leg is more likely to swell. If the leg is already swollen, restricting the flow may make the problem worse. Your doctor may want you to wear special bandages or compression garments. When properly fitted, these will not restrict the lymph fluid or blood flow.    Wear loose-fitting clothing and jewelry. Do not wear tight underwear or clothing that binds your legs.    Wear shoes that fit properly. They should not rub or chafe the feet.    Change your position often. Do not sit or stand for long periods of time.    When possible, rest with your feet up. Avoid crossing your legs.  Practice healthy habits    Eat a healthy diet with plenty of fruits, vegetables and whole grains. Avoid foods that are high in fat or salt.    Don t smoke. Use alcohol in moderation.    Drink 6 to 8 glasses of water each day to help flush out your system.    Take extra care when flying. Air travel can increase swelling.    Keep doing your normal activities as much as possible. Talk to your doctor or therapist if you are unsure about an activity.  How is edema treated?  Your therapist will use a series of treatments called Complete Decongestive Therapy (CDT) This can greatly reduce the swelling in your legs or help to decrease the risk of swelling. Treatments may include:    Manual lymphatic drainage, or gentle massage-like treatment. This moves fluid from damaged areas to healthy areas where the lymph system works well.    Compression bandages to help soften hard (fibrotic) tissue, reduce swelling and prevent fluid from building up again.    Exercises that will help your lymph vessels work better. They will also move fluid out of the swollen leg and trunk.    Skin and nail care to help prevent infections.    Compression garments (medical support hose or stockings) to help prevent the build-up of fluid.    A home  care program. To help control your edema, your therapist will create a treatment program that meets your specific needs. It may include all or some of the treatments above.  Do I need an order to see a therapist?  Yes. An order is a prescription for therapy. Your order must come from a doctor who is licensed in [place].   After we receive your order, you will have an evaluation by a physical or occupational therapist certified in Complete Decongestive Therapy.  For more information about our services, call your local [facility]. You will find phone numbers on the back of this booklet.     [facility]  [facility]   [address] [place] [address]   tel: [phone]  fax: [phone]  [facility]  [address] [place] [address]  tel: [phone]  fax: [phone]  [facility]  [address] [place] [address]   tel: [phone] (to set up first visit, call [phone])  fax: [phone]  [facility]   [address] [place] [address]   tel: [phone] (to set up first visit, call [phone])  fax: [phone]  [facility]   [address] [address]   tel: [phone] (to set up first visit, call [phone])  fax: [phone]  For informational purposes only. Not to replace the advice of your health care provider.   Copyright  2004, 2007 [facility]. All rights reserved. [employer] 530772  REV 09 10.

## 2021-12-06 NOTE — PROGRESS NOTES
Assessment & Plan     Localized swelling of both lower extremities  Mild swelling in the bilateral feet and ankles.  Little concern for DVT, as patient has no risk factors other than travel.  His symptoms have been more chronic.  Also not concerning for lymphedema at this point.  Possibility of his finasteride causing the swelling is of little concern, as this is a very uncommon side effect.  Certainly possible.  Encourage patient to follow a low-sodium diet and elevate his legs.  I also recommend trialing some compression stockings throughout the day and with any travel.    Benign prostatic hyperplasia with nocturia  Recommend restarting his finasteride and monitoring for worsening edema.  If this occurs, recommend following up with PCP to discuss alternative.    High priority for 2019-nCoV vaccine  - COVID-19,PF,MODERNA (18+ Yrs BOOSTER .25mL)        Return in about 4 weeks (around 12/31/2021) for Follow up with Reuben Garcia.    Beth Collier NP  [facility]    Sanya Platt is a 61 year old who presents with concerns regarding bilateral lower extremity swelling.  This became more pronounced about 2 months ago.  He notices swelling in his feet and ankles, which is worse on the right.  Patient does travel internationally quite frequently for work.  He does think his symptoms are possibly related to this.  He also has concerns regarding his finasteride possibly causing this.  He has been off his finasteride for about 2 months, and felt that his swelling was better.  It was not completely gone.  Unfortunately, with stopping his finasteride his nocturia has increased.  He previously did not tolerate tamsulosin.    Swelling symptoms are worse towards the end of the day.  He does not have significant pain.  No redness, swelling, or pain up into the calf areas.  Patient does not smoke, no history of blood clots or clotting disorders.  He denies any cough, chest pain, or shortness of  breath.  He does get some sciatic pain on the right.  Patient has been elevating his legs.  He has specific concerns today regarding DVT, lymphedema, or cancer causing the lymphedema.  Patient denies any lymph node swelling in the neck, armpits, or groin areas.  He denies any abnormal weight loss.  He does have some fatigue.    Review of Systems   Pertinent items in HPI      Objective    /64 (BP Location: Right arm, Patient Position: Sitting, Cuff Size: Adult Large)   Pulse 68   Wt 88.7 kg (195 lb 8 oz)   BMI 26.33 kg/m    Body mass index is 26.33 kg/m .  Physical Exam   GENERAL: healthy, alert and no distress  NECK: no adenopathy, no asymmetry, masses, or scars and thyroid normal to palpation  RESP: lungs clear to auscultation - no rales, rhonchi or wheezes  CV: regular rate and rhythm, normal S1 S2, no S3 or S4, no murmur, click or rub  MS: trace bilateral peripheral edema in ankles, R>L. No redness or calf swelling/pain.

## 2022-03-29 ENCOUNTER — OFFICE VISIT (OUTPATIENT)
Dept: INTERNAL MEDICINE | Facility: CLINIC | Age: 62
End: 2022-03-29
Payer: COMMERCIAL

## 2022-03-29 VITALS
HEART RATE: 78 BPM | HEIGHT: 72 IN | SYSTOLIC BLOOD PRESSURE: 124 MMHG | OXYGEN SATURATION: 98 % | BODY MASS INDEX: 25.87 KG/M2 | DIASTOLIC BLOOD PRESSURE: 76 MMHG | WEIGHT: 191 LBS

## 2022-03-29 DIAGNOSIS — N40.1 BENIGN PROSTATIC HYPERPLASIA WITH WEAK URINARY STREAM: ICD-10-CM

## 2022-03-29 DIAGNOSIS — G47.33 OBSTRUCTIVE SLEEP APNEA (ADULT) (PEDIATRIC): ICD-10-CM

## 2022-03-29 DIAGNOSIS — Z12.5 SCREENING PSA (PROSTATE SPECIFIC ANTIGEN): ICD-10-CM

## 2022-03-29 DIAGNOSIS — R60.0 BILATERAL LEG EDEMA: Primary | ICD-10-CM

## 2022-03-29 DIAGNOSIS — R39.12 BENIGN PROSTATIC HYPERPLASIA WITH WEAK URINARY STREAM: ICD-10-CM

## 2022-03-29 DIAGNOSIS — Z78.9 STATIN INTOLERANCE: ICD-10-CM

## 2022-03-29 DIAGNOSIS — I25.10 CORONARY ARTERY CALCIFICATION SEEN ON CT SCAN: ICD-10-CM

## 2022-03-29 DIAGNOSIS — E78.2 MIXED HYPERLIPIDEMIA: ICD-10-CM

## 2022-03-29 PROBLEM — S79.811A: Status: RESOLVED | Noted: 2021-06-23 | Resolved: 2022-03-29

## 2022-03-29 PROCEDURE — 99214 OFFICE O/P EST MOD 30 MIN: CPT | Performed by: INTERNAL MEDICINE
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
## 2022-04-01 ENCOUNTER — LAB (OUTPATIENT)
Dept: LAB | Facility: CLINIC | Age: 62
End: 2022-04-01
Payer: COMMERCIAL

## 2022-04-01 DIAGNOSIS — R60.0 BILATERAL LEG EDEMA: ICD-10-CM

## 2022-04-01 DIAGNOSIS — E78.2 MIXED HYPERLIPIDEMIA: ICD-10-CM

## 2022-04-01 DIAGNOSIS — Z12.5 SCREENING PSA (PROSTATE SPECIFIC ANTIGEN): ICD-10-CM

## 2022-04-01 LAB
ALBUMIN SERPL-MCNC: 4 G/DL (ref 3.5–5)
ALP SERPL-CCNC: 77 U/L (ref 45–120)
ALT SERPL W P-5'-P-CCNC: 35 U/L (ref 0–45)
ANION GAP SERPL CALCULATED.3IONS-SCNC: 14 MMOL/L (ref 5–18)
AST SERPL W P-5'-P-CCNC: 26 U/L (ref 0–40)
BILIRUB SERPL-MCNC: 0.6 MG/DL (ref 0–1)
BUN SERPL-MCNC: 10 MG/DL (ref 8–22)
CALCIUM SERPL-MCNC: 9.5 MG/DL (ref 8.5–10.5)
CHLORIDE BLD-SCNC: 105 MMOL/L (ref 98–107)
CHOLEST SERPL-MCNC: 249 MG/DL
CO2 SERPL-SCNC: 23 MMOL/L (ref 22–31)
CREAT SERPL-MCNC: 1.06 MG/DL (ref 0.7–1.3)
ERYTHROCYTE [DISTWIDTH] IN BLOOD BY AUTOMATED COUNT: 12.7 % (ref 10–15)
FASTING STATUS PATIENT QL REPORTED: YES
GFR SERPL CREATININE-BSD FRML MDRD: 80 ML/MIN/1.73M2
GLUCOSE BLD-MCNC: 94 MG/DL (ref 70–125)
HCT VFR BLD AUTO: 49.3 % (ref 40–53)
HDLC SERPL-MCNC: 43 MG/DL
HGB BLD-MCNC: 16.6 G/DL (ref 13.3–17.7)
LDLC SERPL CALC-MCNC: 162 MG/DL
MCH RBC QN AUTO: 30.6 PG (ref 26.5–33)
MCHC RBC AUTO-ENTMCNC: 33.7 G/DL (ref 31.5–36.5)
MCV RBC AUTO: 91 FL (ref 78–100)
NT-PROBNP SERPL-MCNC: 14 PG/ML (ref 0–125)
PLATELET # BLD AUTO: 250 10E3/UL (ref 150–450)
POTASSIUM BLD-SCNC: 4.9 MMOL/L (ref 3.5–5)
PROT SERPL-MCNC: 7.2 G/DL (ref 6–8)
PSA SERPL-MCNC: 5.23 UG/L (ref 0–4.5)
RBC # BLD AUTO: 5.42 10E6/UL (ref 4.4–5.9)
SODIUM SERPL-SCNC: 142 MMOL/L (ref 136–145)
TRIGL SERPL-MCNC: 218 MG/DL
TSH SERPL DL<=0.005 MIU/L-ACNC: 2.15 UIU/ML (ref 0.3–5)
WBC # BLD AUTO: 5.4 10E3/UL (ref 4–11)

## 2022-04-01 PROCEDURE — 80061 LIPID PANEL: CPT

## 2022-04-01 PROCEDURE — 36415 COLL VENOUS BLD VENIPUNCTURE: CPT

## 2022-04-01 PROCEDURE — G0103 PSA SCREENING: HCPCS

## 2022-04-01 PROCEDURE — 80053 COMPREHEN METABOLIC PANEL: CPT

## 2022-04-01 PROCEDURE — 83880 ASSAY OF NATRIURETIC PEPTIDE: CPT

## 2022-04-01 PROCEDURE — 84443 ASSAY THYROID STIM HORMONE: CPT

## 2022-04-01 PROCEDURE — 85027 COMPLETE CBC AUTOMATED: CPT

## 2022-05-25 ENCOUNTER — OFFICE VISIT (OUTPATIENT)
Dept: INTERNAL MEDICINE | Facility: CLINIC | Age: 62
End: 2022-05-25
Payer: COMMERCIAL

## 2022-05-25 VITALS
DIASTOLIC BLOOD PRESSURE: 70 MMHG | BODY MASS INDEX: 25.15 KG/M2 | HEART RATE: 77 BPM | WEIGHT: 186.7 LBS | OXYGEN SATURATION: 96 % | SYSTOLIC BLOOD PRESSURE: 110 MMHG

## 2022-05-25 DIAGNOSIS — G47.33 OBSTRUCTIVE SLEEP APNEA (ADULT) (PEDIATRIC): ICD-10-CM

## 2022-05-25 DIAGNOSIS — I25.10 CORONARY ARTERY CALCIFICATION SEEN ON CT SCAN: ICD-10-CM

## 2022-05-25 DIAGNOSIS — Z78.9 STATIN INTOLERANCE: ICD-10-CM

## 2022-05-25 DIAGNOSIS — R53.82 CHRONIC FATIGUE: ICD-10-CM

## 2022-05-25 DIAGNOSIS — R60.0 BILATERAL LEG EDEMA: Primary | ICD-10-CM

## 2022-05-25 DIAGNOSIS — E78.2 MIXED HYPERLIPIDEMIA: ICD-10-CM

## 2022-05-25 PROCEDURE — 99214 OFFICE O/P EST MOD 30 MIN: CPT | Performed by: INTERNAL MEDICINE

## 2022-05-25 RX ORDER — CETIRIZINE HYDROCHLORIDE 10 MG/1
10 TABLET ORAL DAILY
COMMUNITY
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
## 2022-06-18 ENCOUNTER — HEALTH MAINTENANCE LETTER (OUTPATIENT)
Age: 62
End: 2022-06-18

## 2022-09-20 ENCOUNTER — OFFICE VISIT (OUTPATIENT)
Dept: INTERNAL MEDICINE | Facility: CLINIC | Age: 62
End: 2022-09-20
Payer: COMMERCIAL

## 2022-09-20 VITALS
SYSTOLIC BLOOD PRESSURE: 90 MMHG | OXYGEN SATURATION: 97 % | BODY MASS INDEX: 24.11 KG/M2 | TEMPERATURE: 97.3 F | HEIGHT: 72 IN | DIASTOLIC BLOOD PRESSURE: 64 MMHG | HEART RATE: 66 BPM | WEIGHT: 178 LBS

## 2022-09-20 DIAGNOSIS — E78.2 MIXED DYSLIPIDEMIA: Primary | ICD-10-CM

## 2022-09-20 DIAGNOSIS — Z78.9 STATIN INTOLERANCE: ICD-10-CM

## 2022-09-20 DIAGNOSIS — Z23 HIGH PRIORITY FOR 2019-NCOV VACCINE: ICD-10-CM

## 2022-09-20 DIAGNOSIS — I25.10 CORONARY ARTERY CALCIFICATION SEEN ON CT SCAN: ICD-10-CM

## 2022-09-20 PROCEDURE — 90682 RIV4 VACC RECOMBINANT DNA IM: CPT | Performed by: INTERNAL MEDICINE

## 2022-09-20 PROCEDURE — 91313 COVID-19,PF,MODERNA BIVALENT: CPT | Performed by: INTERNAL MEDICINE

## 2022-09-20 PROCEDURE — 99213 OFFICE O/P EST LOW 20 MIN: CPT | Mod: 25 | Performed by: INTERNAL MEDICINE

## 2022-09-20 PROCEDURE — 90471 IMMUNIZATION ADMIN: CPT | Performed by: INTERNAL MEDICINE

## 2022-09-20 PROCEDURE — 0134A COVID-19,PF,MODERNA BIVALENT: CPT | Performed by: INTERNAL MEDICINE

## 2022-09-20 RX ORDER — ICOSAPENT ETHYL 1 G/1
2 CAPSULE ORAL 2 TIMES DAILY
Qty: 120 CAPSULE | Refills: 11 | Status: SHIPPED | OUTPATIENT
Start: 2022-09-20 | End: 2023-10-08
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
## 2023-01-13 ENCOUNTER — MYC MEDICAL ADVICE (OUTPATIENT)
Dept: INTERNAL MEDICINE | Facility: CLINIC | Age: 63
End: 2023-01-13

## 2023-01-13 DIAGNOSIS — Z78.9 PATIENT TRAVELS: ICD-10-CM

## 2023-01-13 RX ORDER — ACETAZOLAMIDE 125 MG/1
TABLET ORAL
Qty: 20 TABLET | Refills: 0 | Status: SHIPPED | OUTPATIENT
Start: 2023-01-13

## 2023-01-13 NOTE — TELEPHONE ENCOUNTER
Routing refill request to provider for review/approval because:  Drug not active on patient's medication list  Pending Prescriptions:                       Disp   Refills    acetaZOLAMIDE (DIAMOX) 125 MG tablet      20 tab*0            Sig: [ACETAZOLAMIDE (DIAMOX) 125 MG TABLET] Take 1           tablet twice daily, beginning one day prior to           elevated altitude, and continue twice daily while           at elevated altitude.  Strength: 125 mg    Last Written Prescription Date:  8/14/2020  Last Fill Quantity: 20,  # refills: 0   Last office visitwith prescribing provider: 9/20/2022   Future Office Visit:   Next 5 appointments (look out 90 days)    Feb 01, 2023  2:30 PM  (Arrive by 2:10 PM)  Adult Preventative Visit with Dallas Alexis MD  [facility] ([facility] ) [address] [id number] [phone]           MARIBELL MartinezN, RN  [facility]

## 2023-03-22 ENCOUNTER — TRANSFERRED RECORDS (OUTPATIENT)
Dept: HEALTH INFORMATION MANAGEMENT | Facility: CLINIC | Age: 63
End: 2023-03-22
Payer: COMMERCIAL

## 2023-03-24 ENCOUNTER — LAB (OUTPATIENT)
Dept: LAB | Facility: CLINIC | Age: 63
End: 2023-03-24
Payer: COMMERCIAL

## 2023-03-24 DIAGNOSIS — E78.2 MIXED DYSLIPIDEMIA: ICD-10-CM

## 2023-03-24 DIAGNOSIS — Z00.00 ROUTINE GENERAL MEDICAL EXAMINATION AT A HEALTH CARE FACILITY: ICD-10-CM

## 2023-03-24 LAB
CHOLEST SERPL-MCNC: 214 MG/DL
HDLC SERPL-MCNC: 40 MG/DL
LDLC SERPL CALC-MCNC: 152 MG/DL
NONHDLC SERPL-MCNC: 174 MG/DL
TRIGL SERPL-MCNC: 109 MG/DL

## 2023-03-24 PROCEDURE — 80061 LIPID PANEL: CPT

## 2023-03-24 PROCEDURE — 36415 COLL VENOUS BLD VENIPUNCTURE: CPT

## 2023-03-29 ENCOUNTER — OFFICE VISIT (OUTPATIENT)
Dept: INTERNAL MEDICINE | Facility: CLINIC | Age: 63
End: 2023-03-29
Payer: COMMERCIAL

## 2023-03-29 VITALS
HEART RATE: 74 BPM | HEIGHT: 72 IN | RESPIRATION RATE: 16 BRPM | TEMPERATURE: 97.3 F | DIASTOLIC BLOOD PRESSURE: 68 MMHG | OXYGEN SATURATION: 97 % | SYSTOLIC BLOOD PRESSURE: 100 MMHG | BODY MASS INDEX: 25.19 KG/M2 | WEIGHT: 186 LBS

## 2023-03-29 DIAGNOSIS — Z00.00 ROUTINE GENERAL MEDICAL EXAMINATION AT A HEALTH CARE FACILITY: Primary | ICD-10-CM

## 2023-03-29 DIAGNOSIS — Z12.5 SCREENING PSA (PROSTATE SPECIFIC ANTIGEN): ICD-10-CM

## 2023-03-29 DIAGNOSIS — I25.10 CORONARY ARTERY CALCIFICATION SEEN ON CT SCAN: ICD-10-CM

## 2023-03-29 DIAGNOSIS — Z23 TETANUS-DIPHTHERIA (TD) VACCINATION: ICD-10-CM

## 2023-03-29 DIAGNOSIS — E78.2 MIXED HYPERLIPIDEMIA: ICD-10-CM

## 2023-03-29 DIAGNOSIS — N40.0 BENIGN PROSTATIC HYPERPLASIA, UNSPECIFIED WHETHER LOWER URINARY TRACT SYMPTOMS PRESENT: ICD-10-CM

## 2023-03-29 DIAGNOSIS — G47.33 OBSTRUCTIVE SLEEP APNEA (ADULT) (PEDIATRIC): ICD-10-CM

## 2023-03-29 PROCEDURE — 90715 TDAP VACCINE 7 YRS/> IM: CPT | Performed by: INTERNAL MEDICINE

## 2023-03-29 PROCEDURE — 99396 PREV VISIT EST AGE 40-64: CPT | Mod: 25 | Performed by: INTERNAL MEDICINE

## 2023-03-29 PROCEDURE — 99214 OFFICE O/P EST MOD 30 MIN: CPT | Mod: 25 | Performed by: INTERNAL MEDICINE

## 2023-03-29 PROCEDURE — 90471 IMMUNIZATION ADMIN: CPT | Performed by: INTERNAL MEDICINE

## 2023-03-29 RX ORDER — PRAVASTATIN SODIUM 20 MG
20 TABLET ORAL DAILY
Qty: 90 TABLET | Refills: 3 | Status: SHIPPED | OUTPATIENT
Start: 2023-03-29 | End: 2023-03-29

## 2023-03-29 RX ORDER — PRAVASTATIN SODIUM 20 MG
20 TABLET ORAL DAILY
Qty: 90 TABLET | Refills: 3 | Status: SHIPPED | OUTPATIENT
Start: 2023-03-29

## 2023-03-29 ASSESSMENT — ENCOUNTER SYMPTOMS
PALPITATIONS: 0
HEMATOCHEZIA: 0
WEAKNESS: 0
HEADACHES: 0
FEVER: 0
FREQUENCY: 0
JOINT SWELLING: 0
SORE THROAT: 0
DIZZINESS: 0
CHILLS: 0
HEARTBURN: 0
CONSTIPATION: 0
ABDOMINAL PAIN: 0
ARTHRALGIAS: 1
NAUSEA: 0
PARESTHESIAS: 0
COUGH: 0
HEMATURIA: 0
NERVOUS/ANXIOUS: 0
MYALGIAS: 0
DYSURIA: 0
DIARRHEA: 0
SHORTNESS OF BREATH: 0
EYE PAIN: 0
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
## 2023-03-30 ENCOUNTER — TELEPHONE (OUTPATIENT)
Dept: INTERNAL MEDICINE | Facility: CLINIC | Age: 63
End: 2023-03-30
Payer: COMMERCIAL

## 2023-03-30 NOTE — TELEPHONE ENCOUNTER
I sent him [employer] message telling him that we can probably skip the comprehensive metabolic panel on this round, since it was completely normal April 2022, and there is nothing in particular that I'm  monitoring

## 2023-03-30 NOTE — TELEPHONE ENCOUNTER
Call from [facility]    [phone]    CMP lab add on order could not be done. They only keep specimen for 4 days.    Didi Torres on 3/30/2023 at 8:40 AM

## 2023-08-02 ENCOUNTER — OFFICE VISIT (OUTPATIENT)
Dept: SLEEP MEDICINE | Facility: CLINIC | Age: 63
End: 2023-08-02
Attending: INTERNAL MEDICINE
Payer: COMMERCIAL

## 2023-08-02 VITALS
HEIGHT: 72 IN | HEART RATE: 82 BPM | DIASTOLIC BLOOD PRESSURE: 64 MMHG | SYSTOLIC BLOOD PRESSURE: 99 MMHG | BODY MASS INDEX: 25.23 KG/M2 | OXYGEN SATURATION: 97 %

## 2023-08-02 DIAGNOSIS — G47.33 OBSTRUCTIVE SLEEP APNEA (ADULT) (PEDIATRIC): Primary | ICD-10-CM

## 2023-08-02 PROBLEM — Z86.0100 HISTORY OF COLONIC POLYPS: Status: ACTIVE | Noted: 2018-02-13

## 2023-08-02 PROBLEM — D12.5 BENIGN NEOPLASM OF SIGMOID COLON: Status: ACTIVE | Noted: 2023-03-24

## 2023-08-02 PROBLEM — K57.30 DIVERTICULAR DISEASE OF LARGE INTESTINE: Status: ACTIVE | Noted: 2023-03-22

## 2023-08-02 PROCEDURE — 99205 OFFICE O/P NEW HI 60 MIN: CPT | Performed by: NURSE PRACTITIONER

## 2023-08-02 ASSESSMENT — SLEEP AND FATIGUE QUESTIONNAIRES
HOW LIKELY ARE YOU TO NOD OFF OR FALL ASLEEP WHILE SITTING INACTIVE IN A PUBLIC PLACE: SLIGHT CHANCE OF DOZING
HOW LIKELY ARE YOU TO NOD OFF OR FALL ASLEEP IN A CAR, WHILE STOPPED FOR A FEW MINUTES IN TRAFFIC: WOULD NEVER DOZE
HOW LIKELY ARE YOU TO NOD OFF OR FALL ASLEEP WHILE WATCHING TV: WOULD NEVER DOZE
HOW LIKELY ARE YOU TO NOD OFF OR FALL ASLEEP WHILE LYING DOWN TO REST IN THE AFTERNOON WHEN CIRCUMSTANCES PERMIT: HIGH CHANCE OF DOZING
HOW LIKELY ARE YOU TO NOD OFF OR FALL ASLEEP WHILE SITTING AND READING: WOULD NEVER DOZE
HOW LIKELY ARE YOU TO NOD OFF OR FALL ASLEEP WHILE SITTING AND TALKING TO SOMEONE: WOULD NEVER DOZE
HOW LIKELY ARE YOU TO NOD OFF OR FALL ASLEEP WHILE SITTING QUIETLY AFTER LUNCH WITHOUT ALCOHOL: MODERATE CHANCE OF DOZING
HOW LIKELY ARE YOU TO NOD OFF OR FALL ASLEEP WHEN YOU ARE A PASSENGER IN A CAR FOR AN HOUR WITHOUT A BREAK: WOULD NEVER DOZE

## 2023-08-02 NOTE — NURSING NOTE
Chief Complaint   Patient presents with    Consult    Sleep Problem     Wake up due to pain    Fatigue     Crushing fatigue        Initial BP 99/64   Pulse 82   Ht 1.829 m (6')   SpO2 97%   BMI 25.23 kg/m   Estimated body mass index is 25.23 kg/m  as calculated from the following:    Height as of this encounter: 1.829 m (6').    Weight as of 3/29/23: 84.4 kg (186 lb).    Medication Reconciliation: complete    Neck circumference:  inches / 39 centimeters.    DME:     TIAGO Potter [facility]

## 2023-08-02 NOTE — PROGRESS NOTES
Outpatient Sleep Medicine Consultation:      Name: Jermaine Hoyos MRN# [id number]   Age: 63 year old YOB: 1960     Date of Consultation: August 2, 2023  Consultation is requested by: Dallas Alexis MD  [address] [place] [address] Dallas Alexis  Primary care provider: Dallas Alexis       Reason for Sleep Consult:     Jermaine Hoyos is sent by Dallas Alexis for a sleep consultation regarding obstructive sleep apnea, chronic fatigue.     Patient s Reason for visit  Jermaine Hoyos main reason for visit: daytime sleepiness  shifted sleep pattern  Patient states problem(s) started: 1985  Jermaine Hoyos's goals for this visit: determine options  discuss likely causes           Assessment and Plan:     Summary Sleep Diagnoses:  Obstructive sleep apnea, treated with mandibular advancement device    Comorbid Diagnoses:  Overweight  Low back pain  Allergic rhinitis  Hyperlipidemia  Coronary artery calcification  BPH  Elevated PSA      Summary Recommendations:  Orders Placed This Encounter   Procedures    HST-Home Sleep Apnea Test - Noxturnal Returnable   1.  Recommend patient undergo sleep testing.  Patient has known obstructive sleep apnea.  He has never had a test post creation of mandibular advancement device.  This test is for understanding what his current status of his obstructive sleep apnea is.  2.  Recommend patient return to clinic approximately 2 weeks after sleep study has been completed.  At that time we will review the results as well as to determine plan of care going forward.  3.  Recommend patient optimize sleep schedule as well as his sleep hygiene practices.  This will mitigate any future sleep intrusion.  4.  Recommend patient employ safe driving practices such as not driving a car if drowsy.  5.  Weight management to BMI of 30    Summary Counseling:    Sleep Testing Reviewed: HST  Obstructive Sleep Apnea Reviewed   -Discussed pathophysiology of  "obstructive sleep apnea as well as central sleep apnea   -Discussed all methods of treatment of sleep apnea including PAP therapy, mandibular advancement device, surgical options  Complications of Untreated Sleep Apnea Reviewed in the context of his medical diagnoses      Medical Decision-making:   Educational materials provided in instructions    Total time spent reviewing medical records, history and physical examination, review of previous testing and interpretation as well as documentation on this date: 60 minutes    CC: Dallas Alexis          History of Present Illness:     Jermaine Hoyos a pleasant 63-year-old male who presents to the sleep medicine clinic for an evaluation of his known obstructive sleep apnea, daytime sleepiness, and his \"shifted sleep pattern.\"    Patient does admit to moderate sleep maintenance insomnia, and is very dissatisfied with the current quality of her sleep pattern.  He feels his sleep issues have been present since 1985.  He typically goes to bed around 10 PM with a 1 minute sleep latency.  He has 3-5 episodes of nocturnal awakenings for pain, and elimination needs.  When he has difficulty returning to sleep you may listen to a podcast and typically gets out of bed to start his day between 530-6:30 AM.    Mr. Brennan does nap.  He may intentionally nap 4-7 days/week for 20-90 minutes.  He may doze off unintentionally 4-7 days/week.  He has not had any driving mishaps or near mishaps.  He suffers from socially disruptive snoring, and witnessed episodes of apnea.  He also suffers from frequent nocturnal leg movements, recurring nightmares, somniloquy, bruxism, joint pains at night, and anxiety.    Plans for this patient include a home sleep test to evaluate the current status of his known obstructive sleep apnea.  Following that, we will meet again in the clinic to discuss his results as well as to determine the next steps in his plan of care.      Past Sleep " Evaluations:  Patient underwent a split-night polysomnographic study on the evening of 7/28/2009 at [facility] sleep center in [facility], for clinical symptoms of snoring, significant daytime fatigue, excessive daytime sleepiness (mild) with an ESS of 6, and sleep maintenance insomnia.  He had a known obstructive sleep apnea history of an AHI of 20 events/hour obtained by [employer] assessment in 2006.    AHI, overall: 8.1 events/hour  AHI, supine: 19.2 events/hour  RDI, overall: 27.3 events/hour    SaO2, mean: 95%  SaO2, leticai: 90%    Notes: Decreased REM sleep was noted.  Patient slept in both the lateral and supine positions.  Snoring was intermittent and variably intense.      SLEEP-WAKE SCHEDULE:     Work/School Days: Patient goes to school/work: No   Usually gets into bed at 2200  Takes patient about 1 minute to fall asleep  Has trouble falling asleep never almost nights per week  Wakes up in the middle of the night several 3to5 times.  Wakes up due to Pain;Use the bathroom;Uncertain  He has trouble falling back asleep   times a week.   It usually takes   to get back to sleep  Patient is usually up at 0530 to 0630  Uses alarm: No    Weekends/Non-work Days/All Other Days:  Usually gets into bed at 2200   Takes patient about 1 minute to fall asleep  Patient is usually up at 0530 to 0630  Uses alarm: No    Sleep Need  Patient gets  5 to 7 hours sleep on average   Patient thinks he needs about at least 6 but 7good hours would be better sleep    Jermaine Hoyos prefers to sleep in this position(s): Back;Side;Recliner   Patient states they do the following activities in bed: Use phone, computer, or tablet    Naps  Patient takes a purposeful nap 4 to 7 times a week and naps are usually 20 min usually. 90if i have slept poorly in duration  He feels better after a nap:    He dozes off unintentionally 4 to 7 days per week  Patient has had a driving accident or near-miss due to sleepiness/drowsiness: No      SLEEP  DISRUPTIONS:    Breathing/Snoring  Patient snores:Yes  Other people complain about his snoring: Yes  Patient has been told he stops breathing in his sleep:Yes  He has issues with the following: Stuffy nose when you wake up;Getting up to urinate more than once    Movement:  Patient gets pain, discomfort, with an urge to move:  Yes  It happens when he is resting:  No  It happens more at night:  Yes  Patient has been told he kicks his legs at night:  No     Behaviors in Sleep:  Jermaine Hoyos has experienced the following behaviors while sleeping: Recurring Nightmares;Sleep-talking;Teeth grinding  He has experienced sudden muscle weakness during the day:        Is there anything else you would like your sleep provider to know:          CAFFEINE AND OTHER SUBSTANCES:    Patient consumes caffeinated beverages per day:  2. have started to taper to 0  Last caffeine use is usually: 0900  List of any prescribed or over the counter stimulants that patient takes: none  List of any prescribed or over the counter sleep medication patient takes: none  List of previous sleep medications that patient has tried: none  Patient drinks alcohol to help them sleep: No  Patient drinks alcohol near bedtime: Yes    Family History:  Patient has a family member been diagnosed with a sleep disorder: Yes  myself         SCALES:    EPWORTH SLEEPINESS SCALE         8/2/2023     9:05 AM    [place] Sleepiness Scale ( MELINDA Manning  [id number]<br>ESS - [place]/English - Final version - 21 Nov 07 - [facility] [place].)   Sitting and reading Would never doze   Watching TV Would never doze   Sitting, inactive in a public place (e.g. a theatre or a meeting) Slight chance of dozing   As a passenger in a car for an hour without a break Would never doze   Lying down to rest in the afternoon when circumstances permit High chance of dozing   Sitting and talking to someone Would never doze   Sitting quietly after a lunch without alcohol Moderate chance of  dozing   In a car, while stopped for a few minutes in traffic Would never doze   [place] Score (MC) 6   [place] Score (Sleep) 6         INSOMNIA SEVERITY INDEX (AILYN)          8/2/2023     8:49 AM   Insomnia Severity Index (AILYN)   Difficulty falling asleep 0   Difficulty staying asleep 2   Problems waking up too early 2   How SATISFIED/DISSATISFIED are you with your CURRENT sleep pattern? 4   How NOTICEABLE to others do you think your sleep problem is in terms of impairing the quality of your life? 4   How WORRIED/DISTRESSED are you about your current sleep problem? 1   To what extent do you consider your sleep problem to INTERFERE with your daily functioning (e.g. daytime fatigue, mood, ability to function at work/daily chores, concentration, memory, mood, etc.) CURRENTLY? 2   AILYN Total Score 15       Guidelines for Scoring/Interpretation:  Total score categories:  0-7 = No clinically significant insomnia   8-14 = Subthreshold insomnia   15-21 = Clinical insomnia (moderate severity)  22-28 = Clinical insomnia (severe)  Used via courtesy of www.[employer].va.gov with permission from Yousuf Rosen PhD., [facility]      STOP BANG   Stop Bang Questionnaire    Question 8/2/2023  9:06 AM CDT - Filed by Patient   Do you SNORE loudly (louder than talking or loud enough to be heard through closed doors)? Yes   Do you often feel TIRED, fatigued, or sleepy during daytime? Yes   Has anyone OBSERVED you stop breathing during your sleep? Yes   Do you have or are you being treated for high blood PRESSURE? No   BMI more than 35kg/m2? No   AGE over 50 years old? Yes   NECK circumference > 16 inches (40cm)? No   GENDER: Male? Yes   STOP-BANG Total Score (range: 0 - 8) 5 (High risk of SHANNAN) Critical            8/2/2023     9:18 AM   STOP BANG Questionnaire (  2008, the American Society of Anesthesiologists, Inc. [employer]   Neck Cir (cm) Clinic: 39 cm   B/P Clinic: 99/64         GAD7         No data to  "display                  CAGE-AID         No data to display                CAGE-AID reprinted with permission from the [place] Medical Journal, VIVIANE Dwyer. and ANDERS Maria, \"Conjoint screening questionnaires for alcohol and drug abuse\" [place] Medical Journal 94: 135-140, 1995.      PATIENT HEALTH QUESTIONNAIRE-9 (PHQ - 9)        6/23/2021     2:43 PM   PHQ-9 ([employer])   1.  Little interest or pleasure in doing things 0   2.  Feeling down, depressed, or hopeless 0       Developed by Romeo Costello, Valerie Martinez, Nemesio Sommer and colleagues, with an educational alan from [employer] No permission required to reproduce, translate, display or distribute.        Allergies:    Allergies   Allergen Reactions    Atorvastatin Other (See Comments)     Memory loss, joint aches    Ibuprofen Itching     Palms, itching    Naproxen Sodium [Naproxen] Itching     palms    Rosuvastatin Other (See Comments)     Memory loss, muscle aches    Tolmetin Itching     Itching palms and feet       Medications:    Current Outpatient Medications   Medication Sig Dispense Refill    FINASTERIDE PO Take 5 mg by mouth every other day      Icosapent Ethyl 1 g CAPS Take 2 g by mouth 2 times daily 120 capsule 11    pravastatin (PRAVACHOL) 20 MG tablet Take 1 tablet (20 mg) by mouth daily (Patient taking differently: Take 20 mg by mouth daily Per patient takes every other day) 90 tablet 3    acetaZOLAMIDE (DIAMOX) 125 MG tablet [ACETAZOLAMIDE (DIAMOX) 125 MG TABLET] Take 1 tablet twice daily, beginning one day prior to elevated altitude, and continue twice daily while at elevated altitude. Strength: 125 mg (Patient not taking: Reported on 3/29/2023) 20 tablet 0    cetirizine (ZYRTEC) 10 MG tablet Take 10 mg by mouth daily (Patient not taking: Reported on 3/29/2023)         Problem List:  Patient Active Problem List    Diagnosis Date Noted    Statin intolerance 06/23/2021     Priority: Medium    Coronary artery calcification seen on " CT scan 11/30/2020     Priority: Medium    Altitude sickness preventative measures 09/09/2020     Priority: Medium    History of Lyme disease 09/09/2020     Priority: Medium    Allergic rhinitis 09/09/2020     Priority: Medium    BPH (benign prostatic hyperplasia) 02/05/2019     Priority: Medium     Symptoms in 50's  Nocturia, frequency, urgency        Low back pain 02/05/2019     Priority: Medium     Longstanding  With R sciatica  streching        Hyperlipidemia      Priority: Medium     Dx 2013  Did not tolerate Crestor or atorvastatin  LDL to 192; Trig to 312; HDL to 38  Simvastatin-not taking        Elevated PSA 09/19/2016     Priority: Medium     Dx late 40's  Level 5.8        Squamous Cell Carcinoma Of The Skin Of The Face      Priority: Medium     Dx around 2000  Left nasal area  Dermatology-        Obstructive Sleep Apnea      Priority: Medium      Not on CPAP  Sleep on side        Benign Adenomatosis Of The Large Intestine      Priority: Medium     Created by [employer]  [facility] Annotation: Feb 19 2013 11:38AM -  ,  : colonoscipy done   2/14/13            Past Medical/Surgical History:  No past medical history on file.  No past surgical history on file.    Social History:  Social History     Socioeconomic History    Marital status:      Spouse name: Not on file    Number of children: 2    Years of education: Not on file    Highest education level: Not on file   Occupational History    Not on file   Tobacco Use    Smoking status: Never    Smokeless tobacco: Never   Vaping Use    Vaping Use: Never used   Substance and Sexual Activity    Alcohol use: Yes     Alcohol/week: 5.0 - 10.0 standard drinks of alcohol    Drug use: No    Sexual activity: Not on file   Other Topics Concern    Not on file   Social History Narrative    Diet- He has been trying a Mediterranean-like diet    Exercise- Walks his dog for 1 to 2 miles 4 days/ week. Walks at work 1 to 2 times a week.    Wife is an MD     Social  Determinants of Health     Financial Resource Strain: Not on file   Food Insecurity: Not on file   Transportation Needs: Not on file   Physical Activity: Not on file   Stress: Not on file   Social Connections: Not on file   Intimate Partner Violence: Not on file   Housing Stability: Not on file       Family History:  Family History   Problem Relation Age of Onset    Memory loss Mother 75.00    Hyperlipidemia Mother     Cerebrovascular Disease Mother         Maybe    Benign prostatic hyperplasia Father         laser surgery    Transient ischemic attack Father         late 70's    Hyperlipidemia Father     Benign prostatic hyperplasia Maternal Grandfather         had surgery, not sure if CA       Review of Systems:  A complete review of systems reviewed by me is negative with the exeption of what has been mentioned in the history of present illness.  In the last TWO WEEKS have you experienced any of the following symptoms?  Fevers: No  Night Sweats: No  Weight Gain: No  Pain at Night: Yes  Double Vision: No  Changes in Vision: No  Difficulty Breathing through Nose: No  Sore Throat in Morning: No  Dry Mouth in the Morning: No  Shortness of Breath Lying Flat: No  Shortness of Breath With Activity: No  Awakening with Shortness of Breath: No  Increased Cough: No  Heart Racing at Night: No  Swelling in Feet or Legs: No  Diarrhea at Night: No  Heartburn at Night: No  Urinating More than Once at Night: Yes  Losing Control of Urine at Night: No  Joint Pains at Night: Yes  Headaches in Morning: No  Weakness in Arms or Legs: No  Depressed Mood: No  Anxiety: Yes     Physical Examination:  Vitals: BP 99/64   Pulse 82   Ht 1.829 m (6')   SpO2 97%   BMI 25.23 kg/m    BMI= Body mass index is 25.23 kg/m .    Neck Cir (cm): 39 cm    Physical Exam  Vitals and nursing note reviewed.   Constitutional:       General: He is awake.      Appearance: Normal appearance. He is well-developed, well-groomed and overweight.   HENT:      Head:  Normocephalic.      Right Ear: External ear normal.      Nose: Nose normal.      Mouth/Throat:      Mouth: Mucous membranes are moist.      Pharynx: Oropharynx is clear.   Eyes:      Conjunctiva/sclera: Conjunctivae normal.   Cardiovascular:      Rate and Rhythm: Normal rate and regular rhythm.      Pulses: Normal pulses.      Heart sounds: Normal heart sounds.   Pulmonary:      Effort: Pulmonary effort is normal.      Breath sounds: Normal breath sounds.   Musculoskeletal:         General: Normal range of motion.      Cervical back: Normal range of motion and neck supple.   Skin:     General: Skin is warm and dry.      Capillary Refill: Capillary refill takes less than 2 seconds.   Neurological:      General: No focal deficit present.      Mental Status: He is alert and oriented to person, place, and time.   Psychiatric:         Mood and Affect: Mood normal.         Behavior: Behavior normal. Behavior is cooperative.         Thought Content: Thought content normal.         Judgment: Judgment normal.          Mallampati Class: III.  Tonsillar Stage: 1  hidden by pillars.         Data: All pertinent previous laboratory data reviewed     Recent Labs   Lab Test 04/01/22  0725 09/16/20  0745    139   POTASSIUM 4.9 4.8   CHLORIDE 105 106   CO2 23 24   ANIONGAP 14 9   GLC 94 94   BUN 10 11   CR 1.06 1.00   GENTRY 9.5 9.3       Recent Labs   Lab Test 04/01/22  0725   WBC 5.4   RBC 5.42   HGB 16.6   HCT 49.3   MCV 91   MCH 30.6   MCHC 33.7   RDW 12.7          Recent Labs   Lab Test 04/01/22  0725   PROTTOTAL 7.2   ALBUMIN 4.0   BILITOTAL 0.6   ALKPHOS 77   AST 26   ALT 35       TSH (uIU/mL)   Date Value   04/01/2022 2.15       No results found for: UAMP, UBARB, BENZODIAZEUR, UCANN, UCOC, OPIT, UPCP    No results found for: IRONSAT, [id number], ERICK    No results found for: PH, PHARTERIAL, PO2, [id number], SAT, PCO2, HCO3, BASEEXCESS, MANDEEP,  BEB    @LABRCNTIPR(phv:4,pco2v:4,po2v:4,hco3v:4,thea:4,o2per:4)@    Echocardiology: No results found for this or any previous visit (from the past 4320 hour(s)).    Chest x-ray: No results found for this or any previous visit from the past 365 days.      Chest CT: No results found for this or any previous visit from the past 365 days.      PFT: Most Recent B[facility]ze Pulmonary Function Testing    No results found for: 20001  No results found for: 20002  No results found for: 20003  No results found for: 20015  No results found for: 20016  No results found for: 20027  No results found for: 20028  No results found for: 20029  No results found for: 20079  No results found for: 20080  No results found for: 20081  No results found for: 20335  No results found for: 20105  No results found for: 20053  No results found for: 20054  No results found for: 20055      INDIGO Toney CNP 8/2/2023   Sleep Medicine    This note was written with the assistance of the Dragon voice-dictation technology software. The final document, although reviewed, may contain errors. For corrections, please contact the office.

## 2023-08-02 NOTE — PATIENT INSTRUCTIONS
"          MY TREATMENT INFORMATION FOR SLEEP APNEA-  Jermaine Hoyos    DOCTOR : INDIGO Toney CNP        Am I having a home sleep study?  --->Watch the video for the device you are using:    -/drop off device-   https://www.Auctomatic.com/watch?v=yGGFBdELGhk          Frequently asked questions:  1. What is Obstructive Sleep Apnea (SHANNAN)? SHANNAN is the most common type of sleep apnea. Apnea means, \"without breath.\"  Apnea is most often caused by narrowing or collapse of the upper airway as muscles relax during sleep.   Almost everyone has occasional apneas. Most people with sleep apnea have had brief interruptions at night frequently for many years.  The severity of sleep apnea is related to how frequent and severe the events are.   2. What are the consequences of SHANNAN? Symptoms include: feeling sleepy during the day, snoring loudly, gasping or stopping of breathing, trouble sleeping, and occasionally morning headaches or heartburn at night.  Sleepiness can be serious and even increase the risk of falling asleep while driving. Other health consequences may include development of high blood pressure and other cardiovascular disease in persons who are susceptible. Untreated SHANNAN  can contribute to heart disease, stroke and diabetes.   3. What are the treatment options? In most situations, sleep apnea is a lifelong disease that must be managed with daily therapy. Medications are not effective for sleep apnea and surgery is generally not considered until other therapies have been tried. Your treatment is your choice . Continuous Positive Airway (CPAP) works right away and is the therapy that is effective in nearly everyone. An oral device to hold your jaw forward is usually the next most reliable option. Other options include postioning devices (to keep you off your back), weight loss, and surgery including a tongue pacing device. There is more detail about some of these options below.  4. Are my sleep studies " covered by insurance? Although we will request verification of coverage, we advise you also check in advance of the study to ensure there is coverage.    Important tips for those choosing CPAP and similar devices  For new devices, sign up for device LUIS FELIPE to monitor your device for your followup visits  We encourage you to utilize the a[employer] america luis felipe or website (myAir web (resmed.com) ) to monitor your therapy progress and share the data with your healthcare team when you discuss your sleep apnea.                                                    Know your equipment:  CPAP is continuous positive airway pressure that prevents obstructive sleep apnea by keeping the throat from collapsing while you are sleeping. In most cases, the device is  smart  and can slowly self-adjusts if your throat collapses and keeps a record every day of how well you are treated-this information is available to you and your care team.  BPAP is bilevel positive airway pressure that keeps your throat open and also assists each breath with a pressure boost to maintain adequate breathing.  Special kinds of BPAP are used in patients who have inadequate breathing from lung or heart disease. In most cases, the device is  smart  and can slowly self-adjusts to assist breathing. Like CPAP, the device keeps a record of how well you are treated.  Your mask is your connection to the device. You get to choose what feels most comfortable and the staff will help to make sure if fits. Here: are some examples of the different masks that are available:       Key points to remember on your journey with sleep apnea:  Sleep study.  PAP devices often need to be adjusted during a sleep study to show that they are effective and adjusted right.  Good tips to remember: Try wearing just the mask during a quiet time during the day so your body adapts to wearing it. A humidifier is recommended for comfort in most cases to prevent drying of your nose and throat.  Allergy medication from your provider may help you if you are having nasal congestion.  Getting settled-in. It takes more than one night for most of us to get used to wearing a mask. Try wearing just the mask during a quiet time during the day so your body adapts to wearing it. A humidifier is recommended for comfort in most cases. Our team will work with you carefully on the first day and will be in contact within 4 days and again at 2 and 4 weeks for advice and remote device adjustments. Your therapy is evaluated by the device each day.   Use it every night. The more you are able to sleep naturally for 7-8 hours, the more likely you will have good sleep and to prevent health risks or symptoms from sleep apnea. Even if you use it 4 hours it helps. Occasionally all of us are unable to use a medical therapy, in sleep apnea, it is not dangerous to miss one night.   Communicate. Call our skilled team on the number provided on the first day if your visit for problems that make it difficult to wear the device. Over 2 out of 3 patients can learn to wear the device long-term with help from our team. Remember to call our team or your sleep providers if you are unable to wear the device as we may have other solutions for those who cannot adapt to mask CPAP therapy. It is recommended that you sleep your sleep provider within the first 3 months and yearly after that if you are not having problems.   Use it for your health. We encourage use of CPAP masks during daytime quiet periods to allow your face and brain to adapt to the sensation of CPAP so that it will be a more natural sensation to awaken to at night or during naps. This can be very useful during the first few weeks or months of adapting to CPAP though it does not help medically to wear CPAP during wakefulness and  should not be used as a strategy just to meet guidelines.  Take care of your equipment. Make sure you clean your mask and tubing using directions every day  and that your filter and mask are replaced as recommended or if they are not working.     BESIDES CPAP, WHAT OTHER THERAPIES ARE THERE?    Positioning Device  Positioning devices are generally used when sleep apnea is mild and only occurs on your back.This example shows a pillow that straps around the waist. It may be appropriate for those whose sleep study shows milder sleep apnea that occurs primarily when lying flat on one's back. Preliminary studies have shown benefit but effectiveness at home may need to be verified by a home sleep test. These devices are generally not covered by medical insurance.  Examples of devices that maintain sleeping on the back to prevent snoring and mild sleep apnea.    Belt type body positioner  http://AppSame/    Electronic reminder  http://nightshifttherapy.com/            Oral Appliance  What is oral appliance therapy?  An oral appliance device fits on your teeth at night like a retainer used after having braces. The device is made by a specialized dentist and requires several visits over 1-2 months before a manufactured device is made to fit your teeth and is adjusted to prevent your sleep apnea. Once an oral device is working properly, snoring should be improved. A home sleep test may be recommended at that time if to determine whether the sleep apnea is adequately treated.       Some things to remember:  -Oral devices are often, but not always, covered by your medical insurance. Be sure to check with your insurance provider.   -If you are referred for oral therapy, you will be given a list of specialized dentists to consider or you may choose to visit the Web site of the American Academy of Dental Sleep Medicine  -Oral devices are less likely to work if you have severe sleep apnea or are extremely overweight.     More detailed information  An oral appliance is a small acrylic device that fits over the upper and lower teeth  (similar to a retainer or a mouth guard). This  device slightly moves jaw forward, which moves the base of the tongue forward, opens the airway, improves breathing for effective treat snoring and obstructive sleep apnea in perhaps 7 out of 10 people .  The best working devices are custom-made by a dental device  after a mold is made of the teeth 1, 2, 3.  When is an oral appliance indicated?  Oral appliance therapy is recommended as a first-line treatment for patients with primary snoring, mild sleep apnea, and for patients with moderate sleep apnea who prefer appliance therapy to use of CPAP4, 5. Severity of sleep apnea is determined by sleep testing and is based on the number of respiratory events per hour of sleep.   How successful is oral appliance therapy?  The success rate of oral appliance therapy in patients with mild sleep apnea is 75-80% while in patients with moderate sleep apnea it is 50-70%. The chance of success in patients with severe sleep apnea is 40-50%. The research also shows that oral appliances have a beneficial effect on the cardiovascular health of SHANNAN patients at the same magnitude as CPAP therapy7.  Oral appliances should be a second-line treatment in cases of severe sleep apnea, but if not completely successful then a combination therapy utilizing CPAP plus oral appliance therapy may be effective. Oral appliances tend to be effective in a broad range of patients although studies show that the patients who have the highest success are females, younger patients, those with milder disease, and less severe obesity. 3, 6.   Finding a dentist that practices dental sleep medicine  Specific training is available through the American Academy of Dental Sleep Medicine for dentists interested in working in the field of sleep. To find a dentist who is educated in the field of sleep and the use of oral appliances, near you, visit the Web site of the American Academy of Dental Sleep Medicine.    References  1. dayton Velasco al.  Objectively measured vs self-reported compliance during oral appliance therapy for sleep-disordered breathing. Chest 2013; 144(5): 4330-7255.  2. Sukumar, et al. Objective measurement of compliance during oral appliance therapy for sleep-disordered breathing. Thorax 2013; 68(1): 91-96.  3. Barbara et al. Mandibular advancement devices in 620 men and women with SHANNAN and snoring: tolerability and predictors of treatment success. Chest 2004; 125: 8420-0700.  4. Dc et al. Oral appliances for snoring and SHANNAN: a review. Sleep 2006; 29: 244-262.  5. Jocy et al. Oral appliance treatment for SHANNAN: an update. J Clin Sleep Med 2014; 10(2): 215-227.  6. Aditya et al. Predictors of OSAH treatment outcome. J Dent Res 2007; 86: 1343-9651.      Weight Loss:    Weight loss is a long-term strategy that may improve sleep apnea in some patients.    Weight management is a personal decision and the decision should be based on your interest and the potential benefits.  If you are interested in exploring weight loss strategies, the following discussion covers the impact on weight loss on sleep apnea and the approaches that may be successful.    Being overweight does not necessarily mean you will have health consequences.  Those who have BMI over 35 or over 27 with existing medical conditions carries greater risk.   Weight loss decreases severity of sleep apnea in most people with obesity. For those with mild obesity who have developed snoring with weight gain, even 15-30 pound weight loss can improve and occasionally eliminate sleep apnea.  Structured and life-long dietary and health habits are necessary to lose weight and keep healthier weight levels.     Though there may be significant health benefits from weight loss, long-term weight loss is very difficult to achieve- studies show success with dietary management in less than 10% of people. In addition, substantial weight loss may require years of dietary control and  may be difficult if patients have severe obesity. In these cases, surgical management may be considered.  Finally, older individuals who have tolerated obesity without health complications may be less likely to benefit from weight loss strategies.      [unfilled]    Surgery:    Surgery for obstructive sleep apnea is considered generally only when other therapies fail to work. Surgery may be discussed with you if you are having a difficult time tolerating CPAP and or when there is an abnormal structure that requires surgical correction.  Nose and throat surgeries often enlarge the airway to prevent collapse.  Most of these surgeries create pain for 1-2 weeks and up to half of the most common surgeries are not effective throughout life.  You should carefully discuss the benefits and drawbacks to surgery with your sleep provider and surgeon to determine if it is the best solution for you.   More information  Surgery for SHANNAN is directed at areas that are responsible for narrowing or complete obstruction of the airway during sleep.  There are a wide range of procedures available to enlarge and/or stabilize the airway to prevent blockage of breathing in the three major areas where it can occur: the palate, tongue, and nasal regions.  Successful surgical treatment depends on the accurate identification of the factors responsible for obstructive sleep apnea in each person.  A personalized approach is required because there is no single treatment that works well for everyone.  Because of anatomic variation, consultation with an examination by a sleep surgeon is a critical first step in determining what surgical options are best for each patient.  In some cases, examination during sedation may be recommended in order to guide the selection of procedures.  Patients will be counseled about risks and benefits as well as the typical recovery course after surgery. Surgery is typically not a cure for a person s SHANNAN.  However,  surgery will often significantly improve one s SHANNAN severity (termed  success rate ).  Even in the absence of a cure, surgery will decrease the cardiovascular risk associated with OSA7; improve overall quality of life8 (sleepiness, functionality, sleep quality, etc).      Palate Procedures:  Patients with SHANNAN often have narrowing of their airway in the region of their tonsils and uvula.  The goals of palate procedures are to widen the airway in this region as well as to help the tissues resist collapse.  Modern palate procedure techniques focus on tissue conservation and soft tissue rearrangement, rather than tissue removal.  Often the uvula is preserved in this procedure. Residual sleep apnea is common in patient after pharyngoplasty with an average reduction in sleep apnea events of 33%2.      Tongue Procedures:  ExamWhile patients are awake, the muscles that surround the throat are active and keep this region open for breathing. These muscles relax during sleep, allowing the tongue and other structures to collapse and block breathing.  There are several different tongue procedures available.  Selection of a tongue base procedure depends on characteristics seen on physical exam.  Generally, procedures are aimed at removing bulky tissues in this area or preventing the back of the tongue from falling back during sleep.  Success rates for tongue surgery range from 50-62%3.    Hypoglossal Nerve Stimulation:  Hypoglossal nerve stimulation has recently received approval from the [place] Food and Drug Administration for the treatment of obstructive sleep apnea.  This is based on research showing that the system was safe and effective in treating sleep apnea6.  Results showed that the median AHI score decreased 68%, from 29.3 to 9.0. This therapy uses an implant system that senses breathing patterns and delivers mild stimulation to airway muscles, which keeps the airway open during sleep.  The system consists of  three fully implanted components: a small generator (similar in size to a pacemaker), a breathing sensor, and a stimulation lead.  Using a small handheld remote, a patient turns the therapy on before bed and off upon awakening.    Candidates for this device must be greater than 18 years of age, have moderate to severe SHANNAN (AHI between 15-65), BMI less than 35, have tried CPAP/oral appliance for at least 8 weeks without success, and have appropriate upper airway anatomy (determined by a sleep endoscopy performed by Dr. Alex Vargas).    Hypoglossal Nerve Stimulation Pathway:    The sleep surgeon s office will work with the patient through the insurance prior-authorization process (including communications and appeals).    Nasal Procedures:  Nasal obstruction can interfere with nasal breathing during the day and night.  Studies have shown that relief of nasal obstruction can improve the ability of some patients to tolerate positive airway pressure therapy for obstructive sleep apnea1.  Treatment options include medications such as nasal saline, topical corticosteroid and antihistamine sprays, and oral medications such as antihistamines or decongestants. Non-surgical treatments can include external nasal dilators for selected patients. If these are not successful by themselves, surgery can improve the nasal airway either alone or in combination with these other options.      Combination Procedures:  Combination of surgical procedures and other treatments may be recommended, particularly if patients have more than one area of narrowing or persistent positional disease.  The success rate of combination surgery ranges from 66-80%2,3.    References  Louis WORLEY. The Role of the Nose in Snoring and Obstructive Sleep Apnoea: An Update.  Eur Arch Otorhinolaryngol. 2011; 268: 1365-73.   Clover SM; Nida JA; Finesse JR; Pallanch JF; Ganga STOUT; Negin RAMOS; Ruma DOCKERY. Surgical modifications of the upper airway for obstructive sleep  apnea in adults: a systematic review and meta-analysis. SLEEP 2010;33(10):9437-1422. Meli REESE. Hypopharyngeal surgery in obstructive sleep apnea: an evidence-based medicine review.  Arch Otolaryngol Head Neck Surg. 2006 Feb;132(2):206-13.  Ancelmo YRAJAN, Leena Y, Reuben JAMAL. The efficacy of anatomically based multilevel surgery for obstructive sleep apnea. Otolaryngol Head Neck Surg. 2003 Oct;129(4):327-35.  Kezirian E, Goldberg A. Hypopharyngeal Surgery in Obstructive Sleep Apnea: An Evidence-Based Medicine Review. Arch Otolaryngol Head Neck Surg. 2006 Feb;132(2):206-13.  Mary OGLESBY et al. Upper-Airway Stimulation for Obstructive Sleep Apnea.  N Engl J Med. 2014 Jan 9;370(2):139-49.  Grazyna Y et al. Increased Incidence of Cardiovascular Disease in Middle-aged Men with Obstructive Sleep Apnea. Am J Respir Crit Care Med; 2002 166: 159-165  Burgess EM et al. Studying Life Effects and Effectiveness of Palatopharyngoplasty (SLEEP) study: Subjective Outcomes of Isolated Uvulopalatopharyngoplasty. Otolaryngol Head Neck Surg. 2011; 144: 623-631.        WHAT IF I ONLY HAVE SNORING?    Mandibular advancement devices, lateral sleep positioning, long-term weight loss and treatment of nasal allergies have been shown to improve snoring.  Exercising tongue muscles with a game (https://apps.AnchorFree.ESCAPESwithYOU/us/luis felipe/soundly-reduce-snoring/nr9541664050) or stimulating the tongue during the day with a device (https://doi.org/10.3390/pxy64766934) have improved snoring in some individuals.    Remember to Drive Safe... Drive Alive     Sleep health profoundly affects your health, mood, and your safety.  Thirty three percent of the population (one in three of us) is not getting enough sleep and many have a sleep disorder. Not getting enough sleep or having an untreated / undertreated sleep condition may make us sleepy without even knowing it. In fact, our driving could be dramatically impaired due to our sleep health. As your provider, here are some  things I would like you to know about driving:     Here are some warning signs for impairment and dangerous drowsy driving:              -Having been awake more than 16 hours               -Looking tired               -Eyelid drooping              -Head nodding (it could be too late at this point)              -Driving for more than 30 minutes     Some things you could do to make the driving safer if you are experiencing some drowsiness:              -Stop driving and rest              -Call for transportation              -Make sure your sleep disorder is adequately treated     Some things that have been shown NOT to work when experiencing drowsiness while driving:              -Turning on the radio              -Opening windows              -Eating any  distracting  /  entertaining  foods (e.g., sunflower seeds, candy, or any other)              -Talking on the phone      Your decision may not only impact your life, but also the life of others. Please, remember to drive safe for yourself and all of us.

## 2023-08-02 NOTE — NURSING NOTE
Home sleep test and return visit has been scheduled. AVS handouts given to patient. HST instruction handout send via [employer].     TIAGO Potter  [facility] [place]

## 2023-10-08 DIAGNOSIS — E78.2 MIXED DYSLIPIDEMIA: ICD-10-CM

## 2023-10-08 RX ORDER — ICOSAPENT ETHYL 1 G/1
2 CAPSULE ORAL 2 TIMES DAILY
Qty: 120 CAPSULE | Refills: 11 | Status: SHIPPED | OUTPATIENT
Start: 2023-10-08

## 2023-10-11 ENCOUNTER — PATIENT OUTREACH (OUTPATIENT)
Dept: GASTROENTEROLOGY | Facility: CLINIC | Age: 63
End: 2023-10-11
Payer: COMMERCIAL

## 2023-10-23 ASSESSMENT — SLEEP AND FATIGUE QUESTIONNAIRES
HOW LIKELY ARE YOU TO NOD OFF OR FALL ASLEEP WHILE WATCHING TV: WOULD NEVER DOZE
HOW LIKELY ARE YOU TO NOD OFF OR FALL ASLEEP WHILE SITTING AND TALKING TO SOMEONE: WOULD NEVER DOZE
HOW LIKELY ARE YOU TO NOD OFF OR FALL ASLEEP WHEN YOU ARE A PASSENGER IN A CAR FOR AN HOUR WITHOUT A BREAK: WOULD NEVER DOZE
HOW LIKELY ARE YOU TO NOD OFF OR FALL ASLEEP WHILE SITTING AND READING: SLIGHT CHANCE OF DOZING
HOW LIKELY ARE YOU TO NOD OFF OR FALL ASLEEP WHILE SITTING QUIETLY AFTER LUNCH WITHOUT ALCOHOL: WOULD NEVER DOZE
HOW LIKELY ARE YOU TO NOD OFF OR FALL ASLEEP IN A CAR, WHILE STOPPED FOR A FEW MINUTES IN TRAFFIC: WOULD NEVER DOZE
HOW LIKELY ARE YOU TO NOD OFF OR FALL ASLEEP WHILE SITTING INACTIVE IN A PUBLIC PLACE: WOULD NEVER DOZE
HOW LIKELY ARE YOU TO NOD OFF OR FALL ASLEEP WHILE LYING DOWN TO REST IN THE AFTERNOON WHEN CIRCUMSTANCES PERMIT: HIGH CHANCE OF DOZING

## 2023-10-24 ENCOUNTER — OFFICE VISIT (OUTPATIENT)
Dept: SLEEP MEDICINE | Facility: CLINIC | Age: 63
End: 2023-10-24
Payer: COMMERCIAL

## 2023-10-24 VITALS — BODY MASS INDEX: 25.19 KG/M2 | HEIGHT: 72 IN | WEIGHT: 186 LBS

## 2023-10-24 DIAGNOSIS — G47.33 OBSTRUCTIVE SLEEP APNEA (ADULT) (PEDIATRIC): ICD-10-CM

## 2023-10-24 PROCEDURE — G0399 HOME SLEEP TEST/TYPE 3 PORTA: HCPCS | Performed by: INTERNAL MEDICINE

## 2023-10-24 NOTE — PROGRESS NOTES
Pt is completing a home sleep test. Pt was instructed on how to put on the Noxturnal T3 device and associated equipment before going to bed and given the opportunity to practice putting it on before leaving the sleep center. Pt was reminded to bring the home sleep test kit back to the center tomorrow, at agreed upon time for download and reporting.   Neck circumference: 39 CM / 15.25 inches.    Iraida John MA

## 2023-10-25 ENCOUNTER — DOCUMENTATION ONLY (OUTPATIENT)
Dept: SLEEP MEDICINE | Facility: CLINIC | Age: 63
End: 2023-10-25
Payer: COMMERCIAL

## 2023-10-25 NOTE — PROGRESS NOTES
Pt returned HST device. It was downloaded and forwarded data to the clinical specialist for scoring.     Iraida John MA

## 2023-10-25 NOTE — PROGRESS NOTES
This HSAT was performed using a Noxturnal T3 device which recorded snore, sound, movement activity, body position, nasal pressure, oronasal thermal airflow, pulse, oximetry and both chest and abdominal respiratory effort. HSAT data was restricted to the time patient states they were in bed.     HSAT was scored using 1B 4% hypopnea rule.     HST AHI (Non-PAT): 1.6  Snoring was reported as moderate.  Time with SpO2 below 89% was 0.1 minutes.   Overall signal quality was fair     Pt will follow up with sleep provider to determine appropriate therapy.

## 2023-10-26 NOTE — PROCEDURES
HOME SLEEP STUDY INTERPRETATION    Patient: Jermaine Hoyos  MRN: [id number]  YOB: 1960  Study Date: 10/24/2023  Referring Provider: Dallas Alexis MD  Ordering Provider: Brunilda SOOD CNP     Indications for Home Study: Jermaine Hoyos is a 63 year old male with a history of SHANNAN who has repeat sleep study with oral appliance.    Estimated body mass index is 25.23 kg/m  as calculated from the following:    Height as of this encounter: 1.829 m (6').    Weight as of this encounter: 84.4 kg (186 lb).  Total score - Redford: 4 (10/23/2023  5:44 PM)  Total Score: 6 (10/23/2023  5:45 PM)    Data: A full night home sleep study was performed recording the standard physiologic parameters including body position, movement, sound, nasal pressure, thermal oral airflow, chest and abdominal movements with respiratory inductance plethysmography, and oxygen saturation by pulse oximetry. Pulse rate was estimated by oximetry recording. This study was considered adequate based on > 4 hours of quality oximetry and respiratory recording. As specified by the AASM Manual for the Scoring of Sleep and Associated events, version 2.3, Rule VIII.D 1B, 4% oxygen desaturation scoring for hypopneas is used as a standard of care on all home sleep apnea testing.    Analysis Time:  424.8 minutes    Respiration:   Sleep Associated Hypoxemia: sustained hypoxemia was not present. Baseline oxygen saturation was 96%.  Time with saturation less than or equal to 88% was 0.1 minutes. The lowest oxygen saturation was 87%.   Snoring: Snoring was present.  Respiratory events: The home study revealed a presence of 5 obstructive apneas and 4 mixed and central apneas. There were 2 hypopneas resulting in a combined apnea/hypopnea index [AHI] of 1.6 events per hour.  AHI was 3.8 per hour supine, N/A per hour prone, 1.7 per hour on left side, and 0.6 per hour on right side.   Pattern: Excluding events noted above, respiratory rate and  pattern was Normal.    Position: Percent of time spent: supine - 18.5%, prone - 0%, on left - 32.9%, on right - 48.1%.    Heart Rate: By pulse oximetry normal rate was noted.     Assessment:   While the patient no longer had obstructive sleep apnea while on oral appliance, he still had snoring.  Sleep associated hypoxemia was not present.    Recommendations:  Consider positional therapy or further adjustment of oral appliance .  Suggest optimizing sleep hygiene and avoiding sleep deprivation.  Weight management.    Diagnosis Code(s): Snoring R06.83    Sammy Bah DO, October 26, 2023   Diplomate, American Board of Internal Medicine, Sleep Medicine

## 2023-11-08 ENCOUNTER — OFFICE VISIT (OUTPATIENT)
Dept: SLEEP MEDICINE | Facility: CLINIC | Age: 63
End: 2023-11-08
Payer: COMMERCIAL

## 2023-11-08 VITALS
BODY MASS INDEX: 25.79 KG/M2 | WEIGHT: 190.4 LBS | OXYGEN SATURATION: 96 % | HEIGHT: 72 IN | DIASTOLIC BLOOD PRESSURE: 80 MMHG | HEART RATE: 82 BPM | SYSTOLIC BLOOD PRESSURE: 118 MMHG

## 2023-11-08 DIAGNOSIS — G47.22 CIRCADIAN RHYTHM SLEEP DISORDER, ADVANCED SLEEP PHASE TYPE: Primary | ICD-10-CM

## 2023-11-08 PROBLEM — G47.33 OBSTRUCTIVE SLEEP APNEA SYNDROME: Status: ACTIVE | Noted: 2019-12-17

## 2023-11-08 PROCEDURE — 99214 OFFICE O/P EST MOD 30 MIN: CPT | Performed by: NURSE PRACTITIONER

## 2023-11-08 ASSESSMENT — SLEEP AND FATIGUE QUESTIONNAIRES
HOW LIKELY ARE YOU TO NOD OFF OR FALL ASLEEP WHILE SITTING QUIETLY AFTER LUNCH WITHOUT ALCOHOL: WOULD NEVER DOZE
HOW LIKELY ARE YOU TO NOD OFF OR FALL ASLEEP WHILE SITTING AND READING: SLIGHT CHANCE OF DOZING
HOW LIKELY ARE YOU TO NOD OFF OR FALL ASLEEP WHILE SITTING INACTIVE IN A PUBLIC PLACE: SLIGHT CHANCE OF DOZING
HOW LIKELY ARE YOU TO NOD OFF OR FALL ASLEEP WHILE LYING DOWN TO REST IN THE AFTERNOON WHEN CIRCUMSTANCES PERMIT: HIGH CHANCE OF DOZING
HOW LIKELY ARE YOU TO NOD OFF OR FALL ASLEEP WHILE SITTING AND TALKING TO SOMEONE: WOULD NEVER DOZE
HOW LIKELY ARE YOU TO NOD OFF OR FALL ASLEEP IN A CAR, WHILE STOPPED FOR A FEW MINUTES IN TRAFFIC: WOULD NEVER DOZE
HOW LIKELY ARE YOU TO NOD OFF OR FALL ASLEEP WHILE WATCHING TV: WOULD NEVER DOZE
HOW LIKELY ARE YOU TO NOD OFF OR FALL ASLEEP WHEN YOU ARE A PASSENGER IN A CAR FOR AN HOUR WITHOUT A BREAK: WOULD NEVER DOZE

## 2023-11-08 NOTE — NURSING NOTE
Chief Complaint   Patient presents with    Study Results       Initial /80   Pulse 82   Ht 1.829 m (6')   Wt 86.4 kg (190 lb 6.4 oz)   SpO2 96%   BMI 25.82 kg/m   Estimated body mass index is 25.82 kg/m  as calculated from the following:    Height as of this encounter: 1.829 m (6').    Weight as of this encounter: 86.4 kg (190 lb 6.4 oz).    Medication Reconciliation: complete    Neck circumference:     DME: n/a     Iraida John MA

## 2023-11-08 NOTE — PATIENT INSTRUCTIONS
Insomnia and Behavioral Sleep Medicine Program    The [facility] Insomnia and Behavioral Sleep Medicine Program provides non-drug treatment for sleep problems including:    Cognitive-behavioral Therapies for Insomnia (CBT-I)  Management of Shift-work and Jet Lag  Management of Delayed, Advanced and Irregular Circadian Rhythm Sleep Disorders  Imagery Rehearsal Therapy (IRT) for Nightmare Disorder  PAP Therapy Desensitization    You have been referred for consultation with a sleep psychologist who specializes in behavioral sleep medicine and treatment of insomnia.  The [facility] Insomnia and Behavioral Sleep Medicine Program offers individualized telehealth services through our [facility] and online CBT-I.    Preparing for your Consultation    You will need to keep a Sleep Diary for at least a week prior to your visit. Complete the sleep diary each day first thing after you get up by answering a few key questions about your sleep using our convenient mobile jaison or paper sleep diary.  Your answers should be based on your recall of the past 24 hours.  Avoid watching the clock or recording data during the night.     Insomnia  Jaison    The Insomnia  mobile jaison  is a convenient way to keep track of your sleep prior to your sleep consultation.  Simply download the free jaison on your Apple or Android phone and record your information each morning.  The jaison includes training, self-assessment, and sleep schedule recommendations.  Prior to your consultation we recommend you use only the sleep diary function. You can e-mail yourself a copy of your sleep diary data by going to the Settings section and using the [place] User Data function.  During your consultation your provider will review the data with you.          [facility] Sleep Diary    You can also track your sleep using the [facility] paper sleep diary.  You can upload your sleep diary and send it via a [employer]  message, fax it to [phone], or have it with you at the time of your consultation.            CBT-I:  Frequently Asked Questions    What is CBT-I?    Cognitive Behavioral Therapy for Insomnia, also known as CBT-I, is a highly effective non-drug treatment for insomnia. The American College of Physicians recommends CBT-I as the first treatment for chronic insomnia.  Research has shown CBT-I to be safer and more effective long term than sleeping pills.    What does CBT-I involve?     CBT-I targets behaviors that lead to chronic insomnia:  Habits that weaken the bed as a cue for sleep  Habits that weaken your body's sleep drive and sleep/wake clock   Unhelpful sleep thoughts that increase sleep-related worry and arousal.    The process involves 3-6 telehealth visits that guide you to implement proven strategies to get a better night's sleep.    People often see improvement in their sleep within a few weeks. Research shows if you keep practicing the skills you learn your sleep is likely to continue to improve 6-12 months after treatment.    Does this program prescribe or manage sleep medication?    No.  Your prescribing provider is responsible to assist you in managing your sleep medications.  Some people choose to stop using sleep medication prior to or during CBT-I.  Our program can work with your prescribing provider to help reduce or eliminate use of sleep medications.     Getting Started Today!    If you haven't already done so, we recommend you consider making the following changes to your sleep habits prior to your sleep consultation:     Reduce your consumption of caffeine and alcohol.  Both can disrupt sleep and make strengthening your sleep more difficult.  Specifically:    - Avoid caffeine within 6 hours of bedtime   - No more than 3 caffeinated beverages per day (e.g. 8 oz. cup coffee or 12 oz. cup soda)            - No alcohol within 3 hours of bedtime    Make sure your bedroom is quiet, comfortable and  dark.  Noise, light and an uncomfortable sleep space can harm your sleep.      Keep the same sleep schedule 7 days a week.unless you do shift work.      Online CBT-I     If you want to get started today, research indicates that online CBT-I can be effective for some individuals. These programs requires comfort with luis felipe-based or online learning.  However, digital CBT-I programs are not for everyone.  Contraindications include:    Seizure disorders,   Bipolar disorder,   Unstable medical or mental health conditions,   Frailty or risk of falling  Pregnancy    You should consult a sleep specialist before using these resources if you have:    Sleep Apnea  Restless Leg Syndrome  Sleep Walking  REM behavior disorder  Night Terrors  Excessive Daytime Sleepiness  Are engaged in shift work  Use prescription sleep medication    Our Online CBT-I program    If your sleep provider recommends online CBT-I for you , the cost for an entire 6-week program is $40.    To get started, copy and paste the link below which will take you to the landing page to register:                           [email or web address]               If you wish to complete the online CBT-I program but do not plan to follow-up with a sleep provider, you are set to begin the program.    If you are planning to work with an [facility] sleep provider, there are a couple of extra steps you can take to share your sleep data with your sleep provider.  To share sleep log data, go to the left side navigation and click on the  share sleep log  button:         You will be taken to the page below where you will enter  the provider code [facility] into the box.          Once you press the locate button, the information for [facility] will pop up as below.  By pressing the Submit button your data will be sent to our  secure [facility] sleep program portal and is available for review by your provider. You will only need to do this step once.                                   Self-help Workbooks for Insomnia    If you have found self-help books useful in the past, you may want to consider reading one of the following books prior to your consultation:    Say Leann to Insomnia: The Six-Week, Drug-Free Program Developed at [facility] [place].  Harinder Cagle MD. Available in paperback, Meryl, and audiobook.    Overcoming Insomnia: A Cognitive-Behavioral Therapy Approach, Workbook.  Thomas Martinez, PhD  and Hannah Westfall, PhD.  Available in paperback and Meryl.    Quiet Your Mind and Get to Sleep: Solutions to Insomnia for Those with Depression, Anxiety, or Chronic Pain.  Samantha Majano, PhD and Hannah Westfall, PhD.  Available in paperback and Meryl

## 2023-11-14 ENCOUNTER — MYC MEDICAL ADVICE (OUTPATIENT)
Dept: INTERNAL MEDICINE | Facility: CLINIC | Age: 63
End: 2023-11-14
Payer: COMMERCIAL

## 2023-11-14 DIAGNOSIS — E78.2 MIXED HYPERLIPIDEMIA: Primary | ICD-10-CM

## 2023-11-20 ENCOUNTER — LAB (OUTPATIENT)
Dept: LAB | Facility: CLINIC | Age: 63
End: 2023-11-20
Payer: COMMERCIAL

## 2023-11-20 DIAGNOSIS — Z12.5 SCREENING PSA (PROSTATE SPECIFIC ANTIGEN): ICD-10-CM

## 2023-11-20 DIAGNOSIS — E78.2 MIXED HYPERLIPIDEMIA: ICD-10-CM

## 2023-11-20 LAB
CHOLEST SERPL-MCNC: 209 MG/DL
HDLC SERPL-MCNC: 38 MG/DL
LDLC SERPL CALC-MCNC: 131 MG/DL
NONHDLC SERPL-MCNC: 171 MG/DL
PSA SERPL DL<=0.01 NG/ML-MCNC: 4.12 NG/ML (ref 0–4.5)
TRIGL SERPL-MCNC: 198 MG/DL

## 2023-11-20 PROCEDURE — 36415 COLL VENOUS BLD VENIPUNCTURE: CPT

## 2023-11-20 PROCEDURE — 80061 LIPID PANEL: CPT

## 2023-11-20 PROCEDURE — G0103 PSA SCREENING: HCPCS

## 2024-02-28 ENCOUNTER — PATIENT OUTREACH (OUTPATIENT)
Dept: CARE COORDINATION | Facility: CLINIC | Age: 64
End: 2024-02-28
Payer: COMMERCIAL

## 2024-03-19 ENCOUNTER — OFFICE VISIT (OUTPATIENT)
Dept: FAMILY MEDICINE | Facility: CLINIC | Age: 64
End: 2024-03-19
Payer: COMMERCIAL

## 2024-03-19 VITALS
WEIGHT: 189.6 LBS | DIASTOLIC BLOOD PRESSURE: 66 MMHG | HEART RATE: 77 BPM | BODY MASS INDEX: 25.68 KG/M2 | HEIGHT: 72 IN | SYSTOLIC BLOOD PRESSURE: 101 MMHG | TEMPERATURE: 97.5 F | OXYGEN SATURATION: 95 % | RESPIRATION RATE: 16 BRPM

## 2024-03-19 DIAGNOSIS — S00.96XA TICK BITE OF HEAD, UNSPECIFIED PART, INITIAL ENCOUNTER: Primary | ICD-10-CM
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
## 2024-03-19 LAB — B BURGDOR IGG+IGM SER QL: 0.28

## 2024-03-19 PROCEDURE — 99213 OFFICE O/P EST LOW 20 MIN: CPT | Performed by: FAMILY MEDICINE

## 2024-03-19 PROCEDURE — 86618 LYME DISEASE ANTIBODY: CPT | Performed by: FAMILY MEDICINE

## 2024-03-19 PROCEDURE — 36415 COLL VENOUS BLD VENIPUNCTURE: CPT | Performed by: FAMILY MEDICINE

## 2024-03-19 RX ORDER — DOXYCYCLINE HYCLATE 100 MG
200 TABLET ORAL ONCE
Qty: 2 TABLET | Refills: 0 | Status: SHIPPED | OUTPATIENT
Start: 2024-03-19 | End: 2024-03-19

## 2024-03-19 NOTE — PROGRESS NOTES
Assessment & Plan     Tick bite of head, unspecified part, initial encounter  A prophylactic dose of doxycycline has been prescribed.  Lyme testing will be done as well.  If positive we will convert this to a treatment dose.  He will continue to monitor his symptoms.  - doxycycline hyclate (VIBRA-TABS) 100 MG tablet; Take 2 tablets (200 mg) by mouth once for 1 dose  - Lyme Disease Total Abs Bld with Reflex to Confirm CLIA; Future            BMI  Estimated body mass index is 25.71 kg/m  as calculated from the following:    Height as of this encounter: 1.829 m (6').    Weight as of this encounter: 86 kg (189 lb 9.6 oz).             Sanya Platt is a 64 year old, presenting for the following health issues:  Musculoskeletal Problem (C/o deer tick bite, not feeling well--general fatigue, body aches)        3/19/2024     1:09 PM   Additional Questions   Roomed by Anais ACOSTA CMA     History of Present Illness       Reason for visit:  Deer tick /lyme possibility  Symptom onset:  1-3 days ago  Symptoms include:  General fatigue, aches,  Symptom intensity:  Mild  Symptom progression:  Staying the same  Had these symptoms before:  Yes  Has tried/received treatment for these symptoms:  Yes  Previous treatment was successful:  Yes  Prior treatment description:  Doxycyclene  What makes it worse:  ?  What makes it better:  ?    He eats 2-3 servings of fruits and vegetables daily.He consumes 0 sweetened beverage(s) daily.He exercises with enough effort to increase his heart rate 20 to 29 minutes per day.  He exercises with enough effort to increase his heart rate 5 days per week. He is missing 1 dose(s) of medications per week.     Loose stools, fatigue, general body aches  Tick removed on Sunday              Objective    /66 (BP Location: Right arm, Patient Position: Sitting, Cuff Size: Adult Large)   Pulse 77   Temp 97.5  F (36.4  C) (Tympanic)   Resp 16   Ht 1.829 m (6')   Wt 86 kg (189 lb 9.6 oz)   SpO2 95%    BMI 25.71 kg/m    Body mass index is 25.71 kg/m .  Physical Exam   GENERAL: alert and no distress  NECK: no adenopathy, no asymmetry, masses, or scars  RESP: lungs clear to auscultation - no rales, rhonchi or wheezes  CV: regular rate and rhythm, normal S1 S2, no S3 or S4, no murmur, click or rub, no peripheral edema  MS: no gross musculoskeletal defects noted, no edema  SKIN: no suspicious lesions or rashes          Signed Electronically by: Edil Guzman MD

## 2024-04-19 ENCOUNTER — OFFICE VISIT (OUTPATIENT)
Dept: FAMILY MEDICINE | Facility: CLINIC | Age: 64
End: 2024-04-19
Payer: COMMERCIAL

## 2024-04-19 ENCOUNTER — ANCILLARY PROCEDURE (OUTPATIENT)
Dept: GENERAL RADIOLOGY | Facility: CLINIC | Age: 64
End: 2024-04-19
Attending: NURSE PRACTITIONER
Payer: COMMERCIAL

## 2024-04-19 ENCOUNTER — NURSE TRIAGE (OUTPATIENT)
Dept: NURSING | Facility: CLINIC | Age: 64
End: 2024-04-19

## 2024-04-19 VITALS
SYSTOLIC BLOOD PRESSURE: 100 MMHG | HEIGHT: 72 IN | WEIGHT: 185.2 LBS | BODY MASS INDEX: 25.09 KG/M2 | RESPIRATION RATE: 16 BRPM | DIASTOLIC BLOOD PRESSURE: 62 MMHG | OXYGEN SATURATION: 97 % | HEART RATE: 75 BPM | TEMPERATURE: 97.4 F

## 2024-04-19 DIAGNOSIS — S69.92XA HAND INJURY, LEFT, INITIAL ENCOUNTER: ICD-10-CM

## 2024-04-19 DIAGNOSIS — H81.11 BENIGN PAROXYSMAL POSITIONAL VERTIGO OF RIGHT EAR: Primary | ICD-10-CM

## 2024-04-19 PROCEDURE — 99214 OFFICE O/P EST MOD 30 MIN: CPT | Performed by: NURSE PRACTITIONER

## 2024-04-19 PROCEDURE — 73130 X-RAY EXAM OF HAND: CPT | Mod: TC | Performed by: RADIOLOGY

## 2024-04-19 RX ORDER — MECLIZINE HCL 12.5 MG 12.5 MG/1
12.5 TABLET ORAL 3 TIMES DAILY PRN
Qty: 30 TABLET | Refills: 0 | Status: SHIPPED | OUTPATIENT
Start: 2024-04-19 | End: 2024-05-28

## 2024-04-19 NOTE — TELEPHONE ENCOUNTER
"Nurse Triage SBAR    Is this a 2nd Level Triage? NO    Situation:  Patient reporting \"sensation problem.\" Feeling \"like I am floating.\"    Background: Denies any previous history of similar symptoms.    Assessment:  Patient reports waking in past 30 minutes with a \"Sensation problem, I thought I might be dizzy or out of balance.\"  Patient stating he has been able to do exercises, ambulate unassisted. Denies any symptoms of vertigo.  \"Just feel like I am floating.\"   Left ear congestion x 1 week.  Patient denies any chest pain/pressure, shortness of breath, or any other symptoms.    Protocol Recommended Disposition:   See today in clinic. Advised to call back or be seen in ED/911 with any increase or change in symptoms.      Reason for Disposition   MODERATE dizziness (e.g., interferes with normal activities)  (Exception: Dizziness caused by heat exposure, sudden standing, or poor fluid intake.)    Additional Information   Negative: SEVERE difficulty breathing (e.g., struggling for each breath, speaks in single words)   Negative: Shock suspected (e.g., cold/pale/clammy skin, too weak to stand, low BP, rapid pulse)   Negative: Difficult to awaken or acting confused (e.g., disoriented, slurred speech)   Negative: Fainted, and still feels dizzy afterwards   Negative: Overdose (accidental or intentional) of medications   Negative: New neurologic deficit that is present now: * Weakness of the face, arm, or leg on one side of the body * Numbness of the face, arm, or leg on one side of the body * Loss of speech or garbled speech   Negative: Heart beating < 50 beats per minute OR > 140 beats per minute   Negative: Sounds like a life-threatening emergency to the triager   Negative: SEVERE dizziness (e.g., unable to stand, requires support to walk, feels like passing out now)   Negative: SEVERE headache or neck pain   Negative: Spinning or tilting sensation (vertigo) present now and one or more stroke risk factors (i.e., " hypertension, diabetes mellitus, prior stroke/TIA, heart attack, age over 60) (Exception: Prior physician evaluation for this AND no different/worse than usual.)   Negative: Neurologic deficit that was brief (now gone), ANY of the following:* Weakness of the face, arm, or leg on one side of the body* Numbness of the face, arm, or leg on one side of the body* Loss of speech or garbled speech   Negative: Loss of vision or double vision  (Exception: Similar to previous migraines.)   Negative: Extra heartbeats, irregular heart beating, or heart is beating very fast (i.e., 'palpitations')   Negative: Difficulty breathing   Negative: Drinking very little and dehydration suspected (e.g., no urine > 12 hours, very dry mouth, very lightheaded)   Negative: Follows bleeding (e.g., stomach, rectum, vagina)  (Exception: Became dizzy from sight of small amount blood.)   Negative: Patient sounds very sick or weak to the triager   Negative: Lightheadedness (dizziness) present now, after 2 hours of rest and fluids   Negative: Spinning or tilting sensation (vertigo) present now   Negative: Fever > 103 F (39.4 C)   Negative: Fever > 100.0 F (37.8 C) and has diabetes mellitus or a weak immune system (e.g., HIV positive, cancer chemotherapy, organ transplant, splenectomy, chronic steroids)    Protocols used: Dizziness-A-[place]

## 2024-04-19 NOTE — PROGRESS NOTES
"  Assessment & Plan     Benign paroxysmal positional vertigo of right ear  Sudden onset of unsteadiness with position changes onset this morning.  Mildly positive Tali-Hallpike to the right.  His neurological exam is within normal limits, cranial nerves II through XII grossly intact.  Recommend physical therapy for vestibular rehab and meclizine as below.  We discussed reasons to present to emergency department, in particular for signs of stroke.  Recommend follow up for worsening symptoms or if dizziness does not improve with PT.    - Physical Therapy  Referral; Future  - meclizine (ANTIVERT) 12.5 MG tablet; Take 1 tablet (12.5 mg) by mouth 3 times daily as needed for dizziness    Hand injury, left, initial encounter  Did have a hand injury 6 weeks ago where he struck his left hand with a hammer and has persistent pain.  X-ray is negative for fracture.  - XR Hand Left G/E 3 Views; Future            BMI  Estimated body mass index is 25.12 kg/m  as calculated from the following:    Height as of this encounter: 1.829 m (6').    Weight as of this encounter: 84 kg (185 lb 3.2 oz).             Subjective   Lc is a 64 year old, presenting for the following health issues:  Ear Problem (Rt ear congestion) and Dizziness (Ear congestion causing lightheadedness)        4/19/2024    10:52 AM   Additional Questions   Roomed by MO Daniel     Lc is a pleasant 64-year-old male patient who presents today for dizziness.  Symptoms started today, symptoms are difficult to describe but he feels unsteady, like \"a drunk feeling\".  Not exactly dizziness.  The room is not spinning around him.  Symptom onset suddenly this morning when he sat up out of bed and seems to occur with position changes.  He feels okay right now as long as he does not change positions too much.  Denies any recent URI symptoms, no exposures to COVID-19 or other URI.  Although, relates his mom is in the nursing home so could have exposure there.  Relates " there has been quite a bit of stress with caring for his aging parents.  He tired from 3 AM in November 2022.  Has history of suspected TIA in parents.  Grandfather with stroke.    Has history of hyperlipidemia, with ASCVD of 8.3%.  Tried all the statins, he is on the 3rd or 4th one.  Has side effects of muscle aches and memory concerns.  Considering injectable.     The 10-year ASCVD risk score (Karla EVERETT, et al., 2019) is: 8.3%    Values used to calculate the score:      Age: 64 years      Sex: Male      Is Non- : No      Diabetic: No      Tobacco smoker: No      Systolic Blood Pressure: 100 mmHg      Is BP treated: No      HDL Cholesterol: 38 mg/dL      Total Cholesterol: 209 mg/dL    Also has left hand pain after injury from hammer about 6 weeks ago.       History of Present Illness       Reason for visit:  Off balance, indescript  Symptom onset:  Today  Symptoms include:  Not exactly dizzy, not exactly lost balance,but unsteady  Symptom intensity:  Mild  Symptom progression:  Improving  Had these symptoms before:  No  What makes it worse:  Not yet  What makes it better:  Kindness    He eats 2-3 servings of fruits and vegetables daily.He consumes 0 sweetened beverage(s) daily.He exercises with enough effort to increase his heart rate 10 to 19 minutes per day.  He exercises with enough effort to increase his heart rate 4 days per week. He is missing 1 dose(s) of medications per week.  He is not taking prescribed medications regularly due to remembering to take.                 Review of Systems  Constitutional, neuro, ENT, endocrine, pulmonary, cardiac, gastrointestinal, genitourinary, musculoskeletal, integument and psychiatric systems are negative, except as otherwise noted.      Objective    /62 (BP Location: Right arm, Patient Position: Sitting, Cuff Size: Adult Regular)   Pulse 75   Temp 97.4  F (36.3  C) (Tympanic)   Resp 16   Ht 1.829 m (6')   Wt 84 kg (185 lb 3.2 oz)    "SpO2 97%   BMI 25.12 kg/m    Body mass index is 25.12 kg/m .  Physical Exam  Constitutional:       General: He is not in acute distress.     Appearance: Normal appearance. He is not ill-appearing.   HENT:      Head: Normocephalic and atraumatic.      Right Ear: Tympanic membrane, ear canal and external ear normal.      Left Ear: Tympanic membrane, ear canal and external ear normal.      Nose: Nose normal.      Mouth/Throat:      Mouth: Mucous membranes are moist.   Eyes:      Extraocular Movements: Extraocular movements intact.      Conjunctiva/sclera: Conjunctivae normal.      Pupils: Pupils are equal, round, and reactive to light.   Cardiovascular:      Rate and Rhythm: Normal rate and regular rhythm.      Pulses: Normal pulses.      Heart sounds: Normal heart sounds.   Pulmonary:      Effort: Pulmonary effort is normal.      Breath sounds: Normal breath sounds.   Abdominal:      General: Abdomen is flat.   Musculoskeletal:         General: Normal range of motion.   Skin:     General: Skin is warm and dry.   Neurological:      Mental Status: He is alert and oriented to person, place, and time.      Cranial Nerves: No cranial nerve deficit.      Sensory: No sensory deficit.      Motor: No weakness.      Coordination: Coordination normal.      Gait: Gait normal.      Deep Tendon Reflexes: Reflexes normal.      Comments: Mildly positive Tali-Hallpike to the right. Neg \"Romberg, Neg head impulse   Psychiatric:         Mood and Affect: Mood normal.         Behavior: Behavior normal.                    Signed Electronically by: INDIGO Ribeiro CNP    "

## 2024-05-28 ENCOUNTER — OFFICE VISIT (OUTPATIENT)
Dept: FAMILY MEDICINE | Facility: CLINIC | Age: 64
End: 2024-05-28
Payer: COMMERCIAL

## 2024-05-28 VITALS
HEIGHT: 72 IN | WEIGHT: 191.6 LBS | SYSTOLIC BLOOD PRESSURE: 102 MMHG | RESPIRATION RATE: 16 BRPM | TEMPERATURE: 98 F | DIASTOLIC BLOOD PRESSURE: 64 MMHG | OXYGEN SATURATION: 97 % | HEART RATE: 73 BPM | BODY MASS INDEX: 25.95 KG/M2
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
## 2024-05-28 DIAGNOSIS — M79.10 MUSCLE PAIN: Primary | ICD-10-CM

## 2024-05-28 PROCEDURE — 99213 OFFICE O/P EST LOW 20 MIN: CPT | Performed by: NURSE PRACTITIONER

## 2024-05-28 PROCEDURE — 36415 COLL VENOUS BLD VENIPUNCTURE: CPT | Performed by: NURSE PRACTITIONER

## 2024-05-28 PROCEDURE — 86618 LYME DISEASE ANTIBODY: CPT | Performed by: NURSE PRACTITIONER

## 2024-05-28 RX ORDER — FINASTERIDE 5 MG/1
5 TABLET, FILM COATED ORAL EVERY OTHER DAY
COMMUNITY

## 2024-05-28 NOTE — PROGRESS NOTES
"  Assessment & Plan     (M79.10) Muscle pain  (primary encounter diagnosis)  Comment:   Plan: Lyme Disease Total Abs Bld with Reflex to         Confirm CLIA            (W57.XXXD) Tick bite, unspecified site, subsequent encounter  Comment:   Plan:     Tick bite in March with onset 2-4 weeks ago body aches. Would like repeat testing for Lyme, did take doxy in March x1 dose. Did have lyme infection in 2020          BMI  Estimated body mass index is 25.99 kg/m  as calculated from the following:    Height as of this encounter: 1.829 m (6').    Weight as of this encounter: 86.9 kg (191 lb 9.6 oz).             Sanya Platt is a 64 year old, presenting for the following health issues: patient is here following up on a tick bite, removed a tick 03/17/24  - has visit 03/19/24 and prescribed prophylactic Doxy, patient now c/o muscle aches and joint pain - \"all over\".  Has felt achy for 2-4 weeks.  Wonders if this might be Lyme related.  Did help his daughter move and feels 'so old'.    No recent rash, some HA (yesterday) and two weeks ago had a runny nose..  Fever 2 weeks ago.  He is not taking his Pravastatin regular, he feels he has memory loss and cognitive decline with use of statins (he has tried many statins).    Hx of positive Lyme test in 2020, negative test for him 3/2024  Musculoskeletal Problem, Tick Bite, and Follow Up        5/28/2024     9:45 AM   Additional Questions   Roomed by dallas navas   Accompanied by self     Musculoskeletal Problem    History of Present Illness       Reason for visit:  Lymes differential    He eats 2-3 servings of fruits and vegetables daily.He consumes 0 sweetened beverage(s) daily.He exercises with enough effort to increase his heart rate 20 to 29 minutes per day.  He exercises with enough effort to increase his heart rate 3 or less days per week. He is missing 2 dose(s) of medications per week.     Hyperlipidemia Follow-Up    Are you regularly taking any medication or supplement to " lower your cholesterol?   Yes- Pravastatin  Are you having muscle aches or other side effects that you think could be caused by your cholesterol lowering medication?  No              Objective    /64 (BP Location: Right arm, Patient Position: Sitting)   Pulse 73   Temp 98  F (36.7  C)   Resp 16   Ht 1.829 m (6')   Wt 86.9 kg (191 lb 9.6 oz)   SpO2 97%   BMI 25.99 kg/m    Body mass index is 25.99 kg/m .  Physical Exam   Aox3 NAD              Signed Electronically by: Cookie Madrid NP

## 2024-05-29 LAB — B BURGDOR IGG+IGM SER QL: 0.34

## 2024-06-08 ENCOUNTER — HEALTH MAINTENANCE LETTER (OUTPATIENT)
Age: 64
End: 2024-06-08

## 2024-06-14 ENCOUNTER — TRANSFERRED RECORDS (OUTPATIENT)
Dept: HEALTH INFORMATION MANAGEMENT | Facility: CLINIC | Age: 64
End: 2024-06-14
Payer: COMMERCIAL

## 2024-06-23 ENCOUNTER — NURSE TRIAGE (OUTPATIENT)
Dept: NURSING | Facility: CLINIC | Age: 64
End: 2024-06-23
Payer: COMMERCIAL

## 2024-06-23 ENCOUNTER — OFFICE VISIT (OUTPATIENT)
Dept: FAMILY MEDICINE | Facility: CLINIC | Age: 64
End: 2024-06-23
Payer: COMMERCIAL

## 2024-06-23 VITALS
SYSTOLIC BLOOD PRESSURE: 117 MMHG | HEART RATE: 64 BPM | DIASTOLIC BLOOD PRESSURE: 80 MMHG | OXYGEN SATURATION: 96 % | RESPIRATION RATE: 12 BRPM | TEMPERATURE: 97.8 F
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
## 2024-06-23 DIAGNOSIS — S00.96XA TICK BITE OF HEAD, UNSPECIFIED PART, INITIAL ENCOUNTER: Primary | ICD-10-CM

## 2024-06-23 PROCEDURE — 99213 OFFICE O/P EST LOW 20 MIN: CPT | Performed by: FAMILY MEDICINE

## 2024-06-23 RX ORDER — DOXYCYCLINE 100 MG/1
200 CAPSULE ORAL ONCE
Qty: 2 CAPSULE | Refills: 0 | Status: SHIPPED | OUTPATIENT
Start: 2024-06-23 | End: 2024-06-23

## 2024-06-23 NOTE — TELEPHONE ENCOUNTER
Tick bite:     Pt calling to report that he found a tick on his leg twice the size of a pin head; very small. He is wanting the prophylactic medication for Lyme's Disease and is in the car driving to the [facility] right now with the tick contained.     Tick characteristics: Dark color, shiny, and engorged looking    Pt is declining triage at this time and just wanted to call to find out wait times of the [facility]. Writer was able to locate the wait time for the [facility].     Peggy Love RN   Triage Nurse Advisor on 6/23/2024 at 11:30 AM

## 2024-06-23 NOTE — PROGRESS NOTES
Assessment & Plan     Tick bite of head, unspecified part, initial encounter  Prophylactic doxy  - doxycycline hyclate (VIBRAMYCIN) 100 MG capsule  Dispense: 2 capsule; Refill: 0             No follow-ups on file.    Shabbir Carias MD  [facility]    Sanya Platt is a 64 year old male who presents to clinic today for the following health issues:  Chief Complaint   Patient presents with    Insect Bites     Tick bite on back lower leg.       HPI    Had a tick bite.  On back of leg.  No rash  Not likely a deer tick    May have been on since Friday or Saturday        Review of Systems        Objective    /80   Pulse 64   Temp 97.8  F (36.6  C) (Oral)   Resp 12   SpO2 96%   Physical Exam  Vitals and nursing note reviewed.   Constitutional:       Appearance: Normal appearance.   Skin:     General: Skin is warm and dry.      Findings: No rash.   Neurological:      Mental Status: He is alert.

## 2024-07-02 ENCOUNTER — OFFICE VISIT (OUTPATIENT)
Dept: INTERNAL MEDICINE | Facility: CLINIC | Age: 64
End: 2024-07-02
Payer: COMMERCIAL

## 2024-07-02 VITALS
TEMPERATURE: 97.8 F | HEIGHT: 72 IN | SYSTOLIC BLOOD PRESSURE: 108 MMHG | DIASTOLIC BLOOD PRESSURE: 62 MMHG | OXYGEN SATURATION: 97 % | WEIGHT: 186 LBS | BODY MASS INDEX: 25.19 KG/M2 | HEART RATE: 76 BPM | RESPIRATION RATE: 16 BRPM

## 2024-07-02 DIAGNOSIS — Z12.5 SCREENING PSA (PROSTATE SPECIFIC ANTIGEN): ICD-10-CM

## 2024-07-02 DIAGNOSIS — N40.0 BENIGN PROSTATIC HYPERPLASIA, UNSPECIFIED WHETHER LOWER URINARY TRACT SYMPTOMS PRESENT: ICD-10-CM

## 2024-07-02 DIAGNOSIS — E78.2 MIXED HYPERLIPIDEMIA: ICD-10-CM

## 2024-07-02 DIAGNOSIS — Z00.00 ROUTINE GENERAL MEDICAL EXAMINATION AT A HEALTH CARE FACILITY: Primary | ICD-10-CM

## 2024-07-02 DIAGNOSIS — E78.2 MIXED DYSLIPIDEMIA: ICD-10-CM

## 2024-07-02 DIAGNOSIS — I25.10 CORONARY ARTERY CALCIFICATION SEEN ON CT SCAN: ICD-10-CM

## 2024-07-02 PROCEDURE — 99396 PREV VISIT EST AGE 40-64: CPT | Performed by: INTERNAL MEDICINE

## 2024-07-02 SDOH — HEALTH STABILITY: PHYSICAL HEALTH: ON AVERAGE, HOW MANY DAYS PER WEEK DO YOU ENGAGE IN MODERATE TO STRENUOUS EXERCISE (LIKE A BRISK WALK)?: 4 DAYS

## 2024-07-02 SDOH — HEALTH STABILITY: PHYSICAL HEALTH: ON AVERAGE, HOW MANY MINUTES DO YOU ENGAGE IN EXERCISE AT THIS LEVEL?: 40 MIN

## 2024-07-02 ASSESSMENT — SOCIAL DETERMINANTS OF HEALTH (SDOH): HOW OFTEN DO YOU GET TOGETHER WITH FRIENDS OR RELATIVES?: TWICE A WEEK
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
## 2024-07-05 ENCOUNTER — LAB (OUTPATIENT)
Dept: LAB | Facility: CLINIC | Age: 64
End: 2024-07-05
Payer: COMMERCIAL

## 2024-07-05 DIAGNOSIS — E78.2 MIXED HYPERLIPIDEMIA: ICD-10-CM

## 2024-07-05 DIAGNOSIS — Z00.00 ROUTINE GENERAL MEDICAL EXAMINATION AT A HEALTH CARE FACILITY: ICD-10-CM

## 2024-07-05 DIAGNOSIS — Z12.5 SCREENING PSA (PROSTATE SPECIFIC ANTIGEN): ICD-10-CM

## 2024-07-05 DIAGNOSIS — E78.2 MIXED DYSLIPIDEMIA: ICD-10-CM

## 2024-07-05 LAB
ALBUMIN SERPL BCG-MCNC: 4.3 G/DL (ref 3.5–5.2)
ALP SERPL-CCNC: 69 U/L (ref 40–150)
ALT SERPL W P-5'-P-CCNC: 21 U/L (ref 0–70)
ANION GAP SERPL CALCULATED.3IONS-SCNC: 9 MMOL/L (ref 7–15)
AST SERPL W P-5'-P-CCNC: 24 U/L (ref 0–45)
BILIRUB SERPL-MCNC: 0.5 MG/DL
BUN SERPL-MCNC: 13.3 MG/DL (ref 8–23)
CALCIUM SERPL-MCNC: 9.3 MG/DL (ref 8.8–10.2)
CHLORIDE SERPL-SCNC: 104 MMOL/L (ref 98–107)
CHOLEST SERPL-MCNC: 195 MG/DL
CREAT SERPL-MCNC: 1.01 MG/DL (ref 0.67–1.17)
DEPRECATED HCO3 PLAS-SCNC: 26 MMOL/L (ref 22–29)
EGFRCR SERPLBLD CKD-EPI 2021: 84 ML/MIN/1.73M2
ERYTHROCYTE [DISTWIDTH] IN BLOOD BY AUTOMATED COUNT: 12.8 % (ref 10–15)
FASTING STATUS PATIENT QL REPORTED: YES
FASTING STATUS PATIENT QL REPORTED: YES
GLUCOSE SERPL-MCNC: 109 MG/DL (ref 70–99)
HBA1C MFR BLD: 5.4 % (ref 0–5.6)
HCT VFR BLD AUTO: 47.2 % (ref 40–53)
HDLC SERPL-MCNC: 41 MG/DL
HGB BLD-MCNC: 15.8 G/DL (ref 13.3–17.7)
LDLC SERPL CALC-MCNC: 121 MG/DL
MCH RBC QN AUTO: 30.7 PG (ref 26.5–33)
MCHC RBC AUTO-ENTMCNC: 33.5 G/DL (ref 31.5–36.5)
MCV RBC AUTO: 92 FL (ref 78–100)
NONHDLC SERPL-MCNC: 154 MG/DL
PLATELET # BLD AUTO: 229 10E3/UL (ref 150–450)
POTASSIUM SERPL-SCNC: 4.5 MMOL/L (ref 3.4–5.3)
PROT SERPL-MCNC: 7.1 G/DL (ref 6.4–8.3)
PSA SERPL DL<=0.01 NG/ML-MCNC: 4.31 NG/ML (ref 0–4.5)
RBC # BLD AUTO: 5.14 10E6/UL (ref 4.4–5.9)
SODIUM SERPL-SCNC: 139 MMOL/L (ref 135–145)
TRIGL SERPL-MCNC: 163 MG/DL
TSH SERPL DL<=0.005 MIU/L-ACNC: 2.07 UIU/ML (ref 0.3–4.2)
WBC # BLD AUTO: 4.9 10E3/UL (ref 4–11)

## 2024-07-05 PROCEDURE — G0103 PSA SCREENING: HCPCS

## 2024-07-05 PROCEDURE — 84443 ASSAY THYROID STIM HORMONE: CPT

## 2024-07-05 PROCEDURE — 36415 COLL VENOUS BLD VENIPUNCTURE: CPT

## 2024-07-05 PROCEDURE — 85027 COMPLETE CBC AUTOMATED: CPT

## 2024-07-05 PROCEDURE — 80053 COMPREHEN METABOLIC PANEL: CPT

## 2024-07-05 PROCEDURE — 83036 HEMOGLOBIN GLYCOSYLATED A1C: CPT

## 2024-07-05 PROCEDURE — 80061 LIPID PANEL: CPT

## 2024-10-20 DIAGNOSIS — E78.2 MIXED DYSLIPIDEMIA: ICD-10-CM

## 2024-10-21 RX ORDER — ICOSAPENT ETHYL 1 G/1
2 CAPSULE ORAL 2 TIMES DAILY
Qty: 120 CAPSULE | Refills: 10 | Status: SHIPPED | OUTPATIENT
Start: 2024-10-21

## 2024-12-13 ENCOUNTER — ANCILLARY PROCEDURE (OUTPATIENT)
Dept: GENERAL RADIOLOGY | Facility: CLINIC | Age: 64
End: 2024-12-13
Attending: INTERNAL MEDICINE
Payer: COMMERCIAL

## 2024-12-13 DIAGNOSIS — M43.16 SPONDYLOLISTHESIS OF LUMBAR REGION: ICD-10-CM

## 2024-12-13 PROCEDURE — 72100 X-RAY EXAM L-S SPINE 2/3 VWS: CPT | Mod: TC | Performed by: RADIOLOGY

## 2024-12-17 ENCOUNTER — PATIENT OUTREACH (OUTPATIENT)
Dept: CARE COORDINATION | Facility: CLINIC | Age: 64
End: 2024-12-17
Payer: COMMERCIAL

## 2024-12-19 ENCOUNTER — PATIENT OUTREACH (OUTPATIENT)
Dept: CARE COORDINATION | Facility: CLINIC | Age: 64
End: 2024-12-19
Payer: COMMERCIAL

## 2025-01-28 ENCOUNTER — OFFICE VISIT (OUTPATIENT)
Dept: PHYSICAL MEDICINE AND REHAB | Facility: CLINIC | Age: 65
End: 2025-01-28
Attending: INTERNAL MEDICINE
Payer: COMMERCIAL

## 2025-01-28 VITALS — DIASTOLIC BLOOD PRESSURE: 87 MMHG | HEART RATE: 85 BPM | SYSTOLIC BLOOD PRESSURE: 132 MMHG

## 2025-01-28 DIAGNOSIS — M99.02 SOMATIC DYSFUNCTION OF THORACIC REGION: ICD-10-CM

## 2025-01-28 DIAGNOSIS — M79.18 GLUTEAL PAIN: Primary | ICD-10-CM

## 2025-01-28 DIAGNOSIS — M99.04 SOMATIC DYSFUNCTION OF SACROILIAC JOINT: ICD-10-CM

## 2025-01-28 DIAGNOSIS — M99.06 SOMATIC DYSFUNCTION OF LOWER EXTREMITY: ICD-10-CM

## 2025-01-28 DIAGNOSIS — M48.061 LUMBAR FORAMINAL STENOSIS: ICD-10-CM

## 2025-01-28 DIAGNOSIS — M54.50 LUMBAR SPINE PAIN: ICD-10-CM

## 2025-01-28 DIAGNOSIS — M99.05 SOMATIC DYSFUNCTION OF PELVIS REGION: ICD-10-CM

## 2025-01-28 DIAGNOSIS — M25.851 HIP IMPINGEMENT SYNDROME, RIGHT: ICD-10-CM

## 2025-01-28 DIAGNOSIS — M47.816 LUMBAR FACET ARTHROPATHY: ICD-10-CM

## 2025-01-28 DIAGNOSIS — M99.03 SOMATIC DYSFUNCTION OF LUMBAR REGION: ICD-10-CM

## 2025-01-28 PROCEDURE — 99204 OFFICE O/P NEW MOD 45 MIN: CPT | Mod: 25 | Performed by: PHYSICAL MEDICINE & REHABILITATION

## 2025-01-28 PROCEDURE — 98927 OSTEOPATH MANJ 5-6 REGIONS: CPT | Performed by: PHYSICAL MEDICINE & REHABILITATION

## 2025-01-28 NOTE — LETTER
1/28/2025      Jermaine Hoyos  [address] [place] [address]      Dear Colleague,    Thank you for referring your patient, Jermaine Hoyos, to the [facility]. Please see a copy of my visit note below.    Assessment/Plan:      Lc was seen today for back pain.    Diagnoses and all orders for this visit:    Gluteal pain  -     Physical Therapy  Referral; Future    Hip impingement syndrome, right  -     Physical Therapy  Referral; Future    Lumbar spine pain  -     Physical Therapy  Referral; Future    Lumbar facet arthropathy  -     Physical Therapy  Referral; Future    Lumbar foraminal stenosis  -     Physical Therapy  Referral; Future    Somatic dysfunction of thoracic region  -     OSTEOPATHIC MANIP,5-6 BODY REGN    Somatic dysfunction of lumbar region  -     OSTEOPATHIC MANIP,5-6 BODY REGN    Somatic dysfunction of sacroiliac joint  -     OSTEOPATHIC MANIP,5-6 BODY REGN    Somatic dysfunction of pelvis region  -     OSTEOPATHIC MANIP,5-6 BODY REGN    Somatic dysfunction of lower extremity  -     OSTEOPATHIC MANIP,5-6 BODY REGN    Other orders  -     Spine  Referral         Assessment: Pleasant 65 year old male with a history of hyperlipidemia as well as some untreated mild anxiety, he has enlarged prostate and squamous cell skin cancer with colon    1.  2-month history of significant right gluteal pain at the PSIS anteriorly to the pubic symphysis consistent with mechanical pelvic pain/pelvic joint dysfunction in the setting of likely hip impingement on the right.  This is after starting a running program.    2.  Chronic lumbar spine pain with degenerative disease L5-S1 with facet arthropathy moderate L5-S1 moderate foraminal stenosis and some intermittent right posterior thigh tightness and pain in the knee.  May be radicular component but also has very tight hamstrings and may be component of pelvic joint  dysfunction.    3.  Somatic dysfunctions of the   thoracic spine, lumbar spine, sacrum, pelvis, lower extremities that contribute to the patient's pain complaints.      Discussion:    1.  We discussed the diagnosis and treatment options.  Discussed the options of medications, therapy, Osteopathic manipulative medicine he wants to avoid any medications if possible.  We also discussed further imaging at this time given normal neurologic exam would feel comfortable with starting a treatment program of conservative management prior to any further imaging and he agrees.    2.  Pelvic joint dysfunction physical therapy order placed.    3.  Trial Osteopathic manipulative medicine A.  He agrees to proceed.  Please see attached procedure note.    4.  Home exercises with pelvic isometrics provided    5.  Could consider meloxicam but states he is unable to take NSAIDs due to potential allergic reaction several years ago and will avoid any NSAIDs for him.    6.  Follow-up with me in 1 month.      It was our pleasure caring for your patient today, if there any questions or concerns please do not hesitate to contact us.      Subjective:   Patient ID: Jermaine Hoyos is a 65 year old male.    History of Present Illness: Patient presents for evaluation of low back pain chronic as well as 2-month history of right anterior groin to right side low back pain at the PSIS.  An acute issue 2 months ago started with right PSIS gluteal region with a line anteriorly to the pubic symphysis.  Started a running program last October has been doing more of that over the winter but no specific injury.  Difficult with any exercise rated a 4/10 at worst 0/10 at best 1/10 today.  Feels a band through his pelvis better with rest was on vacation and things have been doing okay for the past 2 weeks but still has some issues with prolonged sitting seems to better with walking as well and somewhat intermittent.    He also has chronic low back pain at  "the lumbosacral junction with some pain on the right leg which is very slight to the knee and occasional pain in the right great toe.  Reports history of this over the past 20 years waxing waning in intensity questionable some weakness in the right leg at times.  No surgery was ever done has had online physical therapy doing home exercises which are some good core program.  He notes some intermittent slow progression of bowel and bladder urgency but no outright incontinence.  Unable to take NSAIDs due to potential allergic reaction in the past.      Imaging: Plain films lumbar spine images personally reviewed from December 2024.  Slight scoliotic curve.  Normal alignment.  No fractures.  Severe disc height loss L5-S1 anterior osteophytes.  Mild disc height loss at other levels.      MRI from 2014 images personally reviewed coronal imaging shows flattening of the femoral head neck junction.  Minimal disc height loss throughout the lumbar spine with exception of L5 on S1 with slight retrolisthesis broad-based disc bulge resulting in mild foraminal stenosis bilaterally.  There is at least moderate facet arthropathy L5-S1         Review of Systems: Complains of ringing in the ears, dental pain, bowel and bladder urgency no incontinence, sexual dysfunction, \"sciatica \"slight.  Has poor sleep anxiety excessive tenderness.  Denies fever, headache, change in vision, chest pain, shortness of breath, nausea, vomiting, painful urination, joint pain balance issues depression. Remainder of 12 point review systems negative unless listed above.      Current Outpatient Medications   Medication Sig Dispense Refill     acetaZOLAMIDE (DIAMOX) 125 MG tablet [ACETAZOLAMIDE (DIAMOX) 125 MG TABLET] Take 1 tablet twice daily, beginning one day prior to elevated altitude, and continue twice daily while at elevated altitude. Strength: 125 mg 20 tablet 0     cetirizine (ZYRTEC) 10 MG tablet Take 10 mg by mouth daily       icosapent ethyl " (VASCEPA) 1 g CAPS capsule TAKE 2 CAPSULES BY MOUTH TWICE A  capsule 10     No current facility-administered medications for this visit.       Past Medical History:   Diagnosis Date     Hyperlipidemia        Family History   Problem Relation Age of Onset     Memory loss Mother 75.00     Hyperlipidemia Mother      Cerebrovascular Disease Mother         Maybe     Benign prostatic hyperplasia Father         laser surgery     Transient ischemic attack Father         late 70's     Hyperlipidemia Father      Benign prostatic hyperplasia Maternal Grandfather         had surgery, not sure if CA         Social History     Socioeconomic History     Marital status:      Spouse name: None     Number of children: 2     Years of education: None     Highest education level: None   Tobacco Use     Smoking status: Never     Passive exposure: Never     Smokeless tobacco: Never   Vaping Use     Vaping status: Never Used   Substance and Sexual Activity     Alcohol use: Yes     Alcohol/week: 5.0 - 10.0 standard drinks of alcohol     Drug use: No   Social History Narrative    Diet- He has been trying a Mediterranean-like diet    Exercise- Walks his dog for 1 to 2 miles 4 days/ week. Walks at work 1 to 2 times a week.    Wife is an MD     Social Drivers of Health     Financial Resource Strain: Low Risk  (7/2/2024)    Financial Resource Strain      Within the past 12 months, have you or your family members you live with been unable to get utilities (heat, electricity) when it was really needed?: No   Food Insecurity: Low Risk  (7/2/2024)    Food Insecurity      Within the past 12 months, did you worry that your food would run out before you got money to buy more?: No      Within the past 12 months, did the food you bought just not last and you didn t have money to get more?: No   Transportation Needs: Low Risk  (7/2/2024)    Transportation Needs      Within the past 12 months, has lack of transportation kept you from medical  appointments, getting your medicines, non-medical meetings or appointments, work, or from getting things that you need?: No   Physical Activity: Sufficiently Active (7/2/2024)    Exercise Vital Sign      Days of Exercise per Week: 4 days      Minutes of Exercise per Session: 40 min   Stress: No Stress Concern Present (7/2/2024)    [employer] [place] [employer] - Occupational Stress Questionnaire      Feeling of Stress : Not at all   Social Connections: Unknown (7/2/2024)    Social Connection and Isolation Panel [NHANES]      Frequency of Social Gatherings with Friends and Family: Twice a week   Interpersonal Safety: Low Risk  (7/2/2024)    Interpersonal Safety      Do you feel physically and emotionally safe where you currently live?: Yes      Within the past 12 months, have you been hit, slapped, kicked or otherwise physically hurt by someone?: No      Within the past 12 months, have you been humiliated or emotionally abused in other ways by your partner or ex-partner?: No   Housing Stability: Low Risk  (7/2/2024)    Housing Stability      Do you have housing? : Yes      Are you worried about losing your housing?: No     Social history: .  Retired, .  2 children.  No tobacco.  Does drink alcohol less than 5/week.    The following portions of the patient's history were reviewed and updated as appropriate: allergies, current medications, past family history, past medical history, past social history, past surgical history and problem list.    Oswestry (SHA) Questionnaire         No data to display                Neck Disability Index:       No data to display                   PHQ-2 Score:         1/28/2025    10:03 AM 3/19/2024     1:05 PM   PHQ-2 ( 1999 [employer])   Q1: Little interest or pleasure in doing things 0 0   Q2: Feeling down, depressed or hopeless 0 0   PHQ-2 Score 0 0   Q1: Little interest or pleasure in doing things  Not at all   Q2: Feeling down, depressed or hopeless  Not at  all   PHQ-2 Score  0                  Objective:   Physical Exam:    /87   Pulse 85   There is no height or weight on file to calculate BMI.      General:  Well-appearing male in no acute distress.  Pleasant, cooperative, and interactive throughout the examination and interview.  CV: No lower extremity edema on inspection or paltation.  Lymphatics: No cervical lymphadenopathy palpated. Eyes: sclera clear. Skin: No rashes or lesions seen over the head/neck, hairline, arms, legs  Respirations unlabored.  MSK: Gait is nonantalgic.  Able to heel-toe walk without difficulty.  Negative Romberg.  Spine: normal AP curves of the C, T, and L spine.   Mildly reduced lumbar flexion finger to floor testing palpation: No significant tenderness lumbar spine or gluteal tissues.  Extremities: Full range of motion of the elbows, and wrists with no effusions or tenderness to palpation.  Mildly reduced range of motion right greater than left hip and internal rotation.  Positive fair test bilaterally.  Full range of motion of the  knees, and ankles with no effusions or tenderness to palpation.  Negative scour maneuver and Butch's test bilaterally. No hypermobility of the upper or lower extremities.  Neurologic exam: Mental status: Patient is alert and oriented with normal affect.  Attention, knowledge, memory, and language are intact.  Normal coordination throughout the examination.  Reflexes are 2+ and symmetric biceps, triceps, brachioradialis, patellar, and Achilles with down-going toes and Negative Mena's.  Sensation is intact to light touch throughout the upper and lower extremities bilaterally.  Manual muscle testing reveals 5 out of 5 in the hip flexors, knee flexors/extensors, ankle plantar flexors, ankle  dorsiflexors, and EHL.  Upper extremities: Grossly normal strength . Normal muscle bulk and tone in the arms and legs.    Negative seated and supine straight leg raise bilaterally.  Hypertonic hamstrings  bilaterally.    Structural exam:   Thoracic spine:   T12 rotated left sidebent left. Lumbar spine: L5 rotated right sidebent left. Pelvis: Left innominate upslip, anterior inferior right innominate. Sacrum: Left on left forward sacral torsion. Lower extremity: Hypertonic hip flexors and quadriceps bilaterally..      Procedure:    After discussing the risks and benefits of osteopathic manipulative medicine, verbal consent was obtained. The somatic dysfunctions listed above were treated with the following techniques:  Thoracic spine:  Lateral recumbent muscle energy.  Lumbar spine: Soft tissue technique and lateral recumbent muscle energy. Pelvis: Still technique and Isometrics. Sacrum: Myofascial release.  Lower extremities: Muscle energy.   The patient tolerated the procedure well and had improved range of motion in all areas treated prior to leaving the clinic.            Again, thank you for allowing me to participate in the care of your patient.        Sincerely,        Matt Ramos DO    Electronically signed

## 2025-01-28 NOTE — PROGRESS NOTES
Assessment/Plan:      Lc was seen today for back pain.    Diagnoses and all orders for this visit:    Gluteal pain  -     Physical Therapy  Referral; Future    Hip impingement syndrome, right  -     Physical Therapy  Referral; Future    Lumbar spine pain  -     Physical Therapy  Referral; Future    Lumbar facet arthropathy  -     Physical Therapy  Referral; Future    Lumbar foraminal stenosis  -     Physical Therapy  Referral; Future    Somatic dysfunction of thoracic region  -     OSTEOPATHIC MANIP,5-6 BODY REGN    Somatic dysfunction of lumbar region  -     OSTEOPATHIC MANIP,5-6 BODY REGN    Somatic dysfunction of sacroiliac joint  -     OSTEOPATHIC MANIP,5-6 BODY REGN    Somatic dysfunction of pelvis region  -     OSTEOPATHIC MANIP,5-6 BODY REGN    Somatic dysfunction of lower extremity  -     OSTEOPATHIC MANIP,5-6 BODY REGN    Other orders  -     Spine  Referral         Assessment: Pleasant 65 year old male with a history of hyperlipidemia as well as some untreated mild anxiety, he has enlarged prostate and squamous cell skin cancer with colon    1.  2-month history of significant right gluteal pain at the PSIS anteriorly to the pubic symphysis consistent with mechanical pelvic pain/pelvic joint dysfunction in the setting of likely hip impingement on the right.  This is after starting a running program.    2.  Chronic lumbar spine pain with degenerative disease L5-S1 with facet arthropathy moderate L5-S1 moderate foraminal stenosis and some intermittent right posterior thigh tightness and pain in the knee.  May be radicular component but also has very tight hamstrings and may be component of pelvic joint dysfunction.    3.  Somatic dysfunctions of the   thoracic spine, lumbar spine, sacrum, pelvis, lower extremities that contribute to the patient's pain complaints.      Discussion:    1.  We discussed the diagnosis and treatment options.  Discussed the  options of medications, therapy, Osteopathic manipulative medicine he wants to avoid any medications if possible.  We also discussed further imaging at this time given normal neurologic exam would feel comfortable with starting a treatment program of conservative management prior to any further imaging and he agrees.    2.  Pelvic joint dysfunction physical therapy order placed.    3.  Trial Osteopathic manipulative medicine A.  He agrees to proceed.  Please see attached procedure note.    4.  Home exercises with pelvic isometrics provided    5.  Could consider meloxicam but states he is unable to take NSAIDs due to potential allergic reaction several years ago and will avoid any NSAIDs for him.    6.  Follow-up with me in 1 month.      It was our pleasure caring for your patient today, if there any questions or concerns please do not hesitate to contact us.      Subjective:   Patient ID: Jermaine Hoyos is a 65 year old male.    History of Present Illness: Patient presents for evaluation of low back pain chronic as well as 2-month history of right anterior groin to right side low back pain at the PSIS.  An acute issue 2 months ago started with right PSIS gluteal region with a line anteriorly to the pubic symphysis.  Started a running program last October has been doing more of that over the winter but no specific injury.  Difficult with any exercise rated a 4/10 at worst 0/10 at best 1/10 today.  Feels a band through his pelvis better with rest was on vacation and things have been doing okay for the past 2 weeks but still has some issues with prolonged sitting seems to better with walking as well and somewhat intermittent.    He also has chronic low back pain at the lumbosacral junction with some pain on the right leg which is very slight to the knee and occasional pain in the right great toe.  Reports history of this over the past 20 years waxing waning in intensity questionable some weakness in the right leg at  "times.  No surgery was ever done has had online physical therapy doing home exercises which are some good core program.  He notes some intermittent slow progression of bowel and bladder urgency but no outright incontinence.  Unable to take NSAIDs due to potential allergic reaction in the past.      Imaging: Plain films lumbar spine images personally reviewed from December 2024.  Slight scoliotic curve.  Normal alignment.  No fractures.  Severe disc height loss L5-S1 anterior osteophytes.  Mild disc height loss at other levels.      MRI from 2014 images personally reviewed coronal imaging shows flattening of the femoral head neck junction.  Minimal disc height loss throughout the lumbar spine with exception of L5 on S1 with slight retrolisthesis broad-based disc bulge resulting in mild foraminal stenosis bilaterally.  There is at least moderate facet arthropathy L5-S1         Review of Systems: Complains of ringing in the ears, dental pain, bowel and bladder urgency no incontinence, sexual dysfunction, \"sciatica \"slight.  Has poor sleep anxiety excessive tenderness.  Denies fever, headache, change in vision, chest pain, shortness of breath, nausea, vomiting, painful urination, joint pain balance issues depression. Remainder of 12 point review systems negative unless listed above.      Current Outpatient Medications   Medication Sig Dispense Refill    acetaZOLAMIDE (DIAMOX) 125 MG tablet [ACETAZOLAMIDE (DIAMOX) 125 MG TABLET] Take 1 tablet twice daily, beginning one day prior to elevated altitude, and continue twice daily while at elevated altitude. Strength: 125 mg 20 tablet 0    cetirizine (ZYRTEC) 10 MG tablet Take 10 mg by mouth daily      icosapent ethyl (VASCEPA) 1 g CAPS capsule TAKE 2 CAPSULES BY MOUTH TWICE A  capsule 10     No current facility-administered medications for this visit.       Past Medical History:   Diagnosis Date    Hyperlipidemia        Family History   Problem Relation Age of Onset    " Memory loss Mother 75.00    Hyperlipidemia Mother     Cerebrovascular Disease Mother         Maybe    Benign prostatic hyperplasia Father         laser surgery    Transient ischemic attack Father         late 70's    Hyperlipidemia Father     Benign prostatic hyperplasia Maternal Grandfather         had surgery, not sure if CA         Social History     Socioeconomic History    Marital status:      Spouse name: None    Number of children: 2    Years of education: None    Highest education level: None   Tobacco Use    Smoking status: Never     Passive exposure: Never    Smokeless tobacco: Never   Vaping Use    Vaping status: Never Used   Substance and Sexual Activity    Alcohol use: Yes     Alcohol/week: 5.0 - 10.0 standard drinks of alcohol    Drug use: No   Social History Narrative    Diet- He has been trying a Mediterranean-like diet    Exercise- Walks his dog for 1 to 2 miles 4 days/ week. Walks at work 1 to 2 times a week.    Wife is an MD     Social Drivers of Health     Financial Resource Strain: Low Risk  (7/2/2024)    Financial Resource Strain     Within the past 12 months, have you or your family members you live with been unable to get utilities (heat, electricity) when it was really needed?: No   Food Insecurity: Low Risk  (7/2/2024)    Food Insecurity     Within the past 12 months, did you worry that your food would run out before you got money to buy more?: No     Within the past 12 months, did the food you bought just not last and you didn t have money to get more?: No   Transportation Needs: Low Risk  (7/2/2024)    Transportation Needs     Within the past 12 months, has lack of transportation kept you from medical appointments, getting your medicines, non-medical meetings or appointments, work, or from getting things that you need?: No   Physical Activity: Sufficiently Active (7/2/2024)    Exercise Vital Sign     Days of Exercise per Week: 4 days     Minutes of Exercise per Session: 40 min    Stress: No Stress Concern Present (7/2/2024)    [place] [employer] - Occupational Stress Questionnaire     Feeling of Stress : Not at all   Social Connections: Unknown (7/2/2024)    Social Connection and Isolation Panel [NHANES]     Frequency of Social Gatherings with Friends and Family: Twice a week   Interpersonal Safety: Low Risk  (7/2/2024)    Interpersonal Safety     Do you feel physically and emotionally safe where you currently live?: Yes     Within the past 12 months, have you been hit, slapped, kicked or otherwise physically hurt by someone?: No     Within the past 12 months, have you been humiliated or emotionally abused in other ways by your partner or ex-partner?: No   Housing Stability: Low Risk  (7/2/2024)    Housing Stability     Do you have housing? : Yes     Are you worried about losing your housing?: No     Social history: .  Retired, .  2 children.  No tobacco.  Does drink alcohol less than 5/week.    The following portions of the patient's history were reviewed and updated as appropriate: allergies, current medications, past family history, past medical history, past social history, past surgical history and problem list.    Oswestry (SHA) Questionnaire         No data to display                Neck Disability Index:       No data to display                   PHQ-2 Score:         1/28/2025    10:03 AM 3/19/2024     1:05 PM   PHQ-2 ( 1999 [employer])   Q1: Little interest or pleasure in doing things 0 0   Q2: Feeling down, depressed or hopeless 0 0   PHQ-2 Score 0 0   Q1: Little interest or pleasure in doing things  Not at all   Q2: Feeling down, depressed or hopeless  Not at all   PHQ-2 Score  0                  Objective:   Physical Exam:    /87   Pulse 85   There is no height or weight on file to calculate BMI.      General:  Well-appearing male in no acute distress.  Pleasant, cooperative, and interactive throughout the examination and interview.  CV:  No lower extremity edema on inspection or paltation.  Lymphatics: No cervical lymphadenopathy palpated. Eyes: sclera clear. Skin: No rashes or lesions seen over the head/neck, hairline, arms, legs  Respirations unlabored.  MSK: Gait is nonantalgic.  Able to heel-toe walk without difficulty.  Negative Romberg.  Spine: normal AP curves of the C, T, and L spine.   Mildly reduced lumbar flexion finger to floor testing palpation: No significant tenderness lumbar spine or gluteal tissues.  Extremities: Full range of motion of the elbows, and wrists with no effusions or tenderness to palpation.  Mildly reduced range of motion right greater than left hip and internal rotation.  Positive fair test bilaterally.  Full range of motion of the  knees, and ankles with no effusions or tenderness to palpation.  Negative scour maneuver and Butch's test bilaterally. No hypermobility of the upper or lower extremities.  Neurologic exam: Mental status: Patient is alert and oriented with normal affect.  Attention, knowledge, memory, and language are intact.  Normal coordination throughout the examination.  Reflexes are 2+ and symmetric biceps, triceps, brachioradialis, patellar, and Achilles with down-going toes and Negative Mena's.  Sensation is intact to light touch throughout the upper and lower extremities bilaterally.  Manual muscle testing reveals 5 out of 5 in the hip flexors, knee flexors/extensors, ankle plantar flexors, ankle  dorsiflexors, and EHL.  Upper extremities: Grossly normal strength . Normal muscle bulk and tone in the arms and legs.    Negative seated and supine straight leg raise bilaterally.  Hypertonic hamstrings bilaterally.    Structural exam:   Thoracic spine:   T12 rotated left sidebent left. Lumbar spine: L5 rotated right sidebent left. Pelvis: Left innominate upslip, anterior inferior right innominate. Sacrum: Left on left forward sacral torsion. Lower extremity: Hypertonic hip flexors and quadriceps  bilaterally..      Procedure:    After discussing the risks and benefits of osteopathic manipulative medicine, verbal consent was obtained. The somatic dysfunctions listed above were treated with the following techniques:  Thoracic spine:  Lateral recumbent muscle energy.  Lumbar spine: Soft tissue technique and lateral recumbent muscle energy. Pelvis: Still technique and Isometrics. Sacrum: Myofascial release.  Lower extremities: Muscle energy.   The patient tolerated the procedure well and had improved range of motion in all areas treated prior to leaving the clinic.

## 2025-01-28 NOTE — PATIENT INSTRUCTIONS
A physical therapy order was provided for you today.  You will be contacted by physical therapy.  If nobody contacts you within 3 to 5 days, please contact the clinic at [phone].  It will be very important for you to do your physical therapy exercises on a regular basis to decrease your pain and prevent future pain flares.   Osteopathic manual medicine today  Start with pelvic isometric exercises

## 2025-02-21 NOTE — PROGRESS NOTES
Urology Outpatient Clinic Visit     Chief Complaint:   LUTS     Referring Provider   Dallas Alexis     History of Present Illness:   Jermaine Hoyos is a 65 year old male with a history of BPH, elevated PSA who presents for evaluation of LUTS.     Per chart review, the patient was seen in clinic on 12/13/24 by Dr. Alexis. Patient with history of BPH and mildly elevated PSA (1/2017--5.1, 12/2017--5.5, 2/2019--4.9, 9/2020 4.3, 4/2022 5.23, 11/2023--4.12, 7/2024 4.31). Started on finasteride in 2020 but stopped due to ED and has been intermittently using since 2021. He did try tamsulosin in the past but did not tolerate due to flomax. He reported urgency and slow stream. UA normal.     Has been taking the finasteride more frequently recently, taking every other day and notices that the ED is much better and his urgency and frequency improve.    Has not had incontinence but had one episode where he almost experienced urge urinary incontinence.    Symptoms have been ongoing for years and are progressively worsening. The patient voids 5-10 times during the day, nocturia x 2. Intermittent weak steam, better with standing. When sitting does have sensation of incomplete bladder empyting sensation.    Denies dysuria, gross hematuria, history of UTIs or kidney stones, and constipation. No prior history of prostate or bladder issues. No constipation.    Drinks 2 L amount of water daily, 26 oz coffee, 0 pop, 0 alcohol.  The patient does not smoke cigarettes.   The patient is without history of  malignancy.       Review of Systems:    Negative 14 system review except as noted on HPI, nurse's note     Past Medical History:     Past Medical History:   Diagnosis Date    Hyperlipidemia         Past Surgical History:   No past surgical history on file.     Medications:     Current Outpatient Medications   Medication Sig Dispense Refill    acetaZOLAMIDE (DIAMOX) 125 MG tablet [ACETAZOLAMIDE (DIAMOX) 125 MG TABLET] Take 1 tablet  twice daily, beginning one day prior to elevated altitude, and continue twice daily while at elevated altitude. Strength: 125 mg 20 tablet 0    cetirizine (ZYRTEC) 10 MG tablet Take 10 mg by mouth daily      icosapent ethyl (VASCEPA) 1 g CAPS capsule TAKE 2 CAPSULES BY MOUTH TWICE A  capsule 10     No current facility-administered medications for this visit.        Allergies:   Atorvastatin, Ibuprofen, Naproxen sodium [naproxen], Rosuvastatin, and Tolmetin         Physical Exam:   Patient is a 65 year old  male   Vitals: There were no vitals taken for this visit.  General Appearance Adult: Alert, no acute distress, oriented.  Lungs: Non-labored breathing.  Heart: No obvious jugular venous distension present.  Neuro: Alert, oriented, speech and mentation normal      Labs and Pathology:    I personally reviewed all applicable laboratory data and went over findings with patient  Significant for:    CBC RESULTS:  Recent Labs   Lab Test 07/05/24 0817 04/01/22  0725 09/16/20  0745   WBC 4.9 5.4 4.6   HGB 15.8 16.6 16.8    250 224        BMP RESULTS:  Recent Labs   Lab Test 07/05/24  0817 04/01/22  0725 09/16/20  0745 02/22/19  0928    142 139 142   POTASSIUM 4.5 4.9 4.8 4.6   CHLORIDE 104 105 106 107   CO2 26 23 24 26   ANIONGAP 9 14 9 9   * 94 94 94   BUN 13.3 10 11 9   CR 1.01 1.06 1.00 1.02   GFRESTIMATED 84 80 >60 >60   GFRESTBLACK  --   --  >60 >60   GENTRY 9.3 9.5 9.3 9.5     UA RESULTS:   Recent Labs   Lab Test 12/13/24  1423   SG 1.025   URINEPH 5.5   NITRITE Negative   RBCU None Seen   WBCU 0-5     PSA RESULTS  Prostate Specific Antigen Screen   Date Value Ref Range Status   07/05/2024 4.31 0.00 - 4.50 ng/mL Final   11/20/2023 4.12 0.00 - 4.50 ng/mL Final   04/01/2022 5.23 (H) 0.00 - 4.50 ug/L Final   09/16/2020 4.3 (H) 0.0 - 3.5 ng/mL Final   02/22/2019 4.9 (H) 0.0 - 3.5 ng/mL Final   12/04/2017 5.5 (H) 0.0 - 3.5 ng/mL Final   01/30/2017 5.1 (H) 0.0 - 3.5 ng/mL Final   09/19/2016 5.1 (H)  0.0 - 3.5 ng/mL Final   04/05/2016 5.8 (H) 0.0 - 3.5 ng/mL Final   03/13/2015 3.0 0.0 - 3.5 ng/mL Final   02/07/2014 3.3 <3.6 ng/mL Final     Comment:     Method is Abbott Prostate-Specific Antigen (PSA)            Standard-WHO 1st International (90:10) as of 09/26/05 06/24/2013 3.2 <3.6 ng/mL Final     Comment:     Method is Abbott Prostate-Specific Antigen (PSA)            Standard-WHO 1st International (90:10) as of 09/26/05              Assessment and Plan:   Jermaine Hoyos is a 65 year old male with a history of BPH with urinary urgency, frequency of symptoms consistent with over active bladder.    -Discussed bladder irritants, encouraged to cut back on coffee intake  -Discussed bowel regimen and staying regular  -Discussed oral hydration, at least 80-90 oz of water per day  -Discussed PFPT, the patient has an luis felipe on his phone of physical therapy exercises and would like to try these exercises first before a formal evaluation  -Discussed medications. The patient is currently taking finasteride every other day with improvement in irritative symptoms and his ED is manageable. He would like to continue this course and is not interested in another alpha blocker or anti-muscarinic, this is reasonable.   -AUA symptom score of 8  -Patient unable to provide urine sample today, therefore UA and PVR not obtained  -After taking finasteride and trial luis felipe physical therapy, follow up in 3-6 months to discuss symptoms  -Patient does have some obstructive symptoms, but notes the irritative symptoms are more bothersome. Did discuss next steps for obstructive symptoms which include UA, PVR, imaging of the prostate, cystoscopy. He would like to attempt medication regimen and physical therapy and visit obstructive symptoms in the future  - Patient requesting refill of finasteride but dose is in question. Patient will call with dose, will provide rx at that time.    Kalee Bustos PA-C  [facility]    59 minutes  spent on the date of the encounter doing chart review, review of outside records, review of test results, interpretation of tests, patient visit and documentation

## 2025-02-24 ENCOUNTER — OFFICE VISIT (OUTPATIENT)
Dept: UROLOGY | Facility: CLINIC | Age: 65
End: 2025-02-24
Attending: INTERNAL MEDICINE
Payer: COMMERCIAL

## 2025-02-24 VITALS — HEART RATE: 73 BPM | TEMPERATURE: 97.1 F | DIASTOLIC BLOOD PRESSURE: 78 MMHG | SYSTOLIC BLOOD PRESSURE: 115 MMHG

## 2025-02-24 DIAGNOSIS — N40.1 BENIGN PROSTATIC HYPERPLASIA WITH URINARY FREQUENCY: ICD-10-CM

## 2025-02-24 DIAGNOSIS — R39.15 URGENCY OF URINATION: ICD-10-CM

## 2025-02-24 DIAGNOSIS — R35.0 BENIGN PROSTATIC HYPERPLASIA WITH URINARY FREQUENCY: ICD-10-CM

## 2025-02-24 DIAGNOSIS — R35.0 URINARY FREQUENCY: Primary | ICD-10-CM

## 2025-02-24 PROCEDURE — 99204 OFFICE O/P NEW MOD 45 MIN: CPT | Performed by: PHYSICIAN ASSISTANT

## 2025-02-24 NOTE — PATIENT INSTRUCTIONS
Below is a list of things that can irritate the bladder and should be eliminated:  Caffeinated soft drinks.  Coffee.  Tea.  Chocolate.  Tomato-based foods.  Acidic juices and fruits. (includes cranberry juice)  Alcohol.  Nicotine  Carbonated drinks.  Aspartame/Nutrasweet.    ________________________________     Bladder Retraining  In this packet, you will learn 3 steps to help you improve your bladder control. If you have any questions regarding any of these steps once you are home, please feel free to call the office.   The 3 steps you will learn are:   Double Voiding   Fluid Guidelines  Timed Voiding   Double Voiding   A technique called double voiding can be used to help ensure that you have fully emptied your bladder while on the toilet. The main idea behind double voiding is to try to void two or even three times during each trip to the bathroom.  By doing this you can reduce residual urine volumes and minimize your chance of having an accident, an infection, or leakage later on.   The technique is simple.  Some people find that they can void, remain on the toilet for a rest period of two to five to ten minutes, and void again.  Others find it useful to void, then stand up and sit back down and attempt to void again.   You can also compress the bladder in order to empty more fully.  To do the Crede maneuver, press firmly with one hand (or both hands) directly into the abdomen over the bladder.  You may also find it helpful to lean forward or rock while sitting on the toilet in order to help empty the bladder better.  Fluid Guidelines   Managing your fluid intake can also help you improve your bladder control.  It is VERY important that you drink at least 5 to 7 glasses of fluid each day. The bulk of this fluid should be water. Drinking appropriate amounts of fluid daily and emptying your bladder at regular intervals helps decrease bladder infections. Managing your problem by restricting fluid intake is  counterproductive, and NOT recommended.     Suggestions Regarding Fluid Intake  Try to spread your fluid consumption out over the course of the day rather than consuming large amounts at one sitting and then going long periods of time without drinking.   Try to minimize fluid consumption after your evening meal.   Try to minimize caffeine and alcohol consumption. Use only decaffeinated coffee, tea or soda when possible.      Timed Voiding    It might be a good idea to start this approach to managing your problem on a weekend or when you plan to be at home or near a bathroom. The purpose of timed voiding is to gradually:   increase the amount of time between emptying your bladder   increase the amount of fluid your bladder can hold, and hopefully,   diminish the sense of urgency and/or leakage associated with your problem.     Week 1 - After awakening, empty your bladder every half-hour on the hour (even if you do NOT feel the need to go). Make sure you are drinking frequently. During the night only go to the bathroom if you waken and find it necessary   Week 2 - Increase the time between emptying your bladder to once per hour, following the above fluid and night instructions.   Week 3 - Increase the time between emptying your bladder to every 1 1/2 hours, following the above fluid and night instructions.   Week 4 - Increase the time between emptying your bladder to every 2 hours, following the above fluid and night instructions.  Work up to voiding every 3 to 3   hours if you can.     If you can already hold your bladder longer than 1/2 hour, you do not need to start at Week 1. Start at the point that is appropriate for you and work up from there. Just remember to 1) increase your voiding intervals by no more than 30 minutes at a time, 2) void regularly even if you do not feel the need to go, and 3) during the night, only go to the bathroom if you awaken and find it necessary.   You will be the best  of how  quickly you can advance to the next step. These instructions are an outline which you can modify (for example, you may find it more comfortable to stretch from 1 to 1 1/4 hours).   You may also increase the pace of this sample schedule, depending on your individual symptoms and bladder capacity. For example, you may increase the hourly increments every 5 days, instead of every 7 days.     Don t Get Discouraged  This program works! The keys to success are self-motivation and gradual increases in the time interval between voids. If you try to progress too rapidly, you will exceed your capabilities and become frustrated.    Some Additional Behavioral Techniques to Help Bladder Control  Do not rush to the bathroom. Try to be calm and maintain control. Rushing to the bathroom can intensify the urge for the bladder to contract.  Do several quick contractions of the pelvic floor muscles. Use effort to keep from leaking. If possible, sit down for direct pressure on the pelvic floor.  Relax. If you have practiced diaphragmatic breathing in the past, use that skill to relax. (Take slow, deep breaths through your nose, and then slowly breathe out through pursed lips.) Use distraction techniques to try and make the urinary urge go away.  When you feel the urge subside, walk slowly and normally to the bathroom. You can repeat the above steps to gain control if the urge returns.  You can slowly proceed to the toilet to empty your bladder once the urge has subsided.   _______    Today:  -we discussed obstructive and irritative symptoms. You have described more irritative sympotoms being bothersome which include urinary urgency and frequency    -Hydrate, at least 80-90 oz per day  -monitor for any constipation  -Avoid bladder irritants  -Trial of pelvic floor physical therapy, if you would like a formal evaluation please let me know  -Continuation with finasteride every other day, please call with the correct dose and I will  provide a prescription  -Follow up in 3-6 months if you are unhappy with your symptoms    -If you would like a formal evaluation of your prostate for obstructive symptoms in the future, please reach out to our clinic.

## 2025-02-25 ENCOUNTER — TELEPHONE (OUTPATIENT)
Dept: UROLOGY | Facility: CLINIC | Age: 65
End: 2025-02-25
Payer: COMMERCIAL

## 2025-02-25 DIAGNOSIS — R39.15 URGENCY OF URINATION: Primary | ICD-10-CM

## 2025-02-25 DIAGNOSIS — N40.1 BENIGN PROSTATIC HYPERPLASIA WITH URINARY FREQUENCY: ICD-10-CM

## 2025-02-25 DIAGNOSIS — R35.0 BENIGN PROSTATIC HYPERPLASIA WITH URINARY FREQUENCY: ICD-10-CM

## 2025-02-25 RX ORDER — FINASTERIDE 5 MG/1
5 TABLET, FILM COATED ORAL DAILY
Qty: 90 TABLET | Refills: 1 | Status: SHIPPED | OUTPATIENT
Start: 2025-02-25

## 2025-02-25 NOTE — TELEPHONE ENCOUNTER
[facility] Call Center    Phone Message    May a detailed message be left on voicemail: yes     Reason for Call: Medication Question or concern regarding medication   Prescription Clarification  Name of Medication: finasteride  Prescribing Provider:     Pharmacy: [facility] [phone]   What on the order needs clarification? Dosage 5mg       Action Taken: Other: [facility]    Travel Screening: Not Applicable     Date of Service:

## 2025-02-25 NOTE — TELEPHONE ENCOUNTER
Spoke with patient to let him know that the medication was sent to his pharmacy.   Lavern Hannah RN

## 2025-05-21 ENCOUNTER — OFFICE VISIT (OUTPATIENT)
Dept: UROLOGY | Facility: CLINIC | Age: 65
End: 2025-05-21
Payer: COMMERCIAL

## 2025-05-21 VITALS — TEMPERATURE: 97.6 F | SYSTOLIC BLOOD PRESSURE: 133 MMHG | DIASTOLIC BLOOD PRESSURE: 84 MMHG | HEART RATE: 75 BPM

## 2025-05-21 DIAGNOSIS — R35.0 BENIGN PROSTATIC HYPERPLASIA WITH URINARY FREQUENCY: ICD-10-CM

## 2025-05-21 DIAGNOSIS — N40.1 BENIGN PROSTATIC HYPERPLASIA WITH URINARY FREQUENCY: ICD-10-CM

## 2025-05-21 DIAGNOSIS — R39.15 URGENCY OF URINATION: ICD-10-CM

## 2025-05-21 RX ORDER — FINASTERIDE 5 MG/1
5 TABLET, FILM COATED ORAL DAILY
Qty: 90 TABLET | Refills: 3 | Status: SHIPPED | OUTPATIENT
Start: 2025-05-21

## 2025-05-21 RX ORDER — PRAVASTATIN SODIUM 20 MG
20 TABLET ORAL EVERY OTHER DAY
COMMUNITY

## 2025-05-21 ASSESSMENT — PAIN SCALES - GENERAL: PAINLEVEL_OUTOF10: NO PAIN (0)

## 2025-05-21 NOTE — PROGRESS NOTES
Urology Outpatient Clinic Visit     Chief Complaint:   LUTS     Referring Provider   Kalee Bustos     History of Present Illness:   Jermaine Hoyos is a 65 year old male with a history of BPH who presents for evaluation of LUTS.     Initial visit 2/24/25: Noted urgency and frequency. History of elevated PSA that has been monitored by Dr. Alexis. Most recent in 7/2024 of 4.31. He has been taking finasteride every other day due to ED with improvement in irritative symptoms. Patient elected for PFPT luis felipe on his phone, cutting back on coffee. Was not interested in flomax or cialis.     Today 5/21/25: Presents for follow up. Stopped drinking occasional beer and now urinary symptoms have improved significantly. Still taking finasteride every other day and working well. Not interested in Flomax but had lightheadedness and not interested in restarting an  alpha blocker.      Review of Systems:    Negative 14 system review except as noted on HPI, nurse's note     Past Medical History:     Past Medical History:   Diagnosis Date    Hyperlipidemia         Past Surgical History:   No past surgical history on file.     Medications:     Current Outpatient Medications   Medication Sig Dispense Refill    acetaZOLAMIDE (DIAMOX) 125 MG tablet [ACETAZOLAMIDE (DIAMOX) 125 MG TABLET] Take 1 tablet twice daily, beginning one day prior to elevated altitude, and continue twice daily while at elevated altitude. Strength: 125 mg (Patient taking differently: [ACETAZOLAMIDE (DIAMOX) 125 MG TABLET] Take 1 tablet twice daily, beginning one day prior to elevated altitude, and continue twice daily while at elevated altitude. Strength: 125 mg) 20 tablet 0    finasteride (PROSCAR) 5 MG tablet Take 1 tablet (5 mg) by mouth daily. Every other day 90 tablet 3    icosapent ethyl (VASCEPA) 1 g CAPS capsule TAKE 2 CAPSULES BY MOUTH TWICE A  capsule 10    pravastatin (PRAVACHOL) 20 MG tablet Take 20 mg by mouth every other day.      cetirizine  (ZYRTEC) 10 MG tablet Take 10 mg by mouth daily (Patient not taking: Reported on 5/21/2025)       No current facility-administered medications for this visit.        Allergies:   Atorvastatin, Ibuprofen, Naproxen sodium [naproxen], Rosuvastatin, and Tolmetin         Physical Exam:   Patient is a 65 year old  male   Vitals: Blood pressure 133/84, pulse 75, temperature 97.6  F (36.4  C), temperature source Temporal.  General Appearance Adult: Alert, no acute distress, oriented.  Lungs: Non-labored breathing.  Heart: No obvious jugular venous distension present.  Neuro: Alert, oriented, speech and mentation normal  : CHRISTINE anodular, symmetric    PVR: 54 mL      Labs and Pathology:    I personally reviewed all applicable laboratory data and went over findings with patient  Significant for:    CBC RESULTS:  Recent Labs   Lab Test 07/05/24  0817 04/01/22  0725 09/16/20  0745   WBC 4.9 5.4 4.6   HGB 15.8 16.6 16.8    250 224     BMP RESULTS:  Recent Labs   Lab Test 07/05/24  0817 04/01/22  0725 09/16/20  0745 02/22/19  0928    142 139 142   POTASSIUM 4.5 4.9 4.8 4.6   CHLORIDE 104 105 106 107   CO2 26 23 24 26   ANIONGAP 9 14 9 9   * 94 94 94   BUN 13.3 10 11 9   CR 1.01 1.06 1.00 1.02   GFRESTIMATED 84 80 >60 >60   GFRESTBLACK  --   --  >60 >60   GENTRY 9.3 9.5 9.3 9.5     UA RESULTS:   Recent Labs   Lab Test 12/13/24  1423   SG 1.025   URINEPH 5.5   NITRITE Negative   RBCU None Seen   WBCU 0-5     PSA RESULTS  Prostate Specific Antigen Screen   Date Value Ref Range Status   07/05/2024 4.31 0.00 - 4.50 ng/mL Final   11/20/2023 4.12 0.00 - 4.50 ng/mL Final   04/01/2022 5.23 (H) 0.00 - 4.50 ug/L Final   09/16/2020 4.3 (H) 0.0 - 3.5 ng/mL Final   02/22/2019 4.9 (H) 0.0 - 3.5 ng/mL Final   12/04/2017 5.5 (H) 0.0 - 3.5 ng/mL Final   01/30/2017 5.1 (H) 0.0 - 3.5 ng/mL Final   09/19/2016 5.1 (H) 0.0 - 3.5 ng/mL Final   04/05/2016 5.8 (H) 0.0 - 3.5 ng/mL Final   03/13/2015 3.0 0.0 - 3.5 ng/mL Final   02/07/2014  3.3 <3.6 ng/mL Final     Comment:     Method is Abbott Prostate-Specific Antigen (PSA)            Standard-WHO 1st International (90:10) as of 09/26/05 06/24/2013 3.2 <3.6 ng/mL Final     Comment:     Method is Abbott Prostate-Specific Antigen (PSA)            Standard-WHO 1st International (90:10) as of 09/26/05            Assessment and Plan:   Jermaine Hoyos is a 65 year old male with a history of BPH on finasteride who presents for follow up    -AUASS 4, better from 2/24/25 of 8  -Continue Finasteride, refill provided  -Follow up yearly for symptom check, UA PVR and AUSS or sooner if not doing well    Kalee Bustos PA-C  [facility]    15 minutes spent on the date of the encounter doing chart review, review of outside records, review of test results, interpretation of tests, patient visit and documentation

## 2025-05-21 NOTE — PROGRESS NOTES
Patient here to see provider for voiding follow up. Lavern Hannah RN    Patient educated regarding bladder scan procedure which writer performed.  Patient voiced understanding of information.  54 ml of urine remaining in bladder.  Lavern Hannah RN

## 2025-06-02 ENCOUNTER — PATIENT OUTREACH (OUTPATIENT)
Dept: CARE COORDINATION | Facility: CLINIC | Age: 65
End: 2025-06-02
Payer: COMMERCIAL

## 2025-06-17 ENCOUNTER — TRANSFERRED RECORDS (OUTPATIENT)
Dept: HEALTH INFORMATION MANAGEMENT | Facility: CLINIC | Age: 65
End: 2025-06-17
Payer: COMMERCIAL

## 2025-06-25 ENCOUNTER — OFFICE VISIT (OUTPATIENT)
Dept: INTERNAL MEDICINE | Facility: CLINIC | Age: 65
End: 2025-06-25
Payer: COMMERCIAL

## 2025-06-25 VITALS
DIASTOLIC BLOOD PRESSURE: 62 MMHG | HEART RATE: 78 BPM | OXYGEN SATURATION: 98 % | TEMPERATURE: 97.7 F | WEIGHT: 188 LBS | RESPIRATION RATE: 16 BRPM | HEIGHT: 73 IN | SYSTOLIC BLOOD PRESSURE: 104 MMHG | BODY MASS INDEX: 24.92 KG/M2

## 2025-06-25 DIAGNOSIS — N40.1 BENIGN PROSTATIC HYPERPLASIA WITH LOWER URINARY TRACT SYMPTOMS, SYMPTOM DETAILS UNSPECIFIED: ICD-10-CM

## 2025-06-25 DIAGNOSIS — E78.2 MIXED HYPERLIPIDEMIA: ICD-10-CM

## 2025-06-25 DIAGNOSIS — Z12.5 SCREENING PSA (PROSTATE SPECIFIC ANTIGEN): ICD-10-CM

## 2025-06-25 DIAGNOSIS — Z00.00 ROUTINE GENERAL MEDICAL EXAMINATION AT A HEALTH CARE FACILITY: Primary | ICD-10-CM

## 2025-06-25 DIAGNOSIS — I25.10 CORONARY ARTERY CALCIFICATION SEEN ON CT SCAN: ICD-10-CM

## 2025-06-25 DIAGNOSIS — G47.33 OBSTRUCTIVE SLEEP APNEA SYNDROME: ICD-10-CM

## 2025-06-25 DIAGNOSIS — Z13.1 SCREENING FOR DIABETES MELLITUS: ICD-10-CM

## 2025-06-25 LAB
ALBUMIN SERPL BCG-MCNC: 4.3 G/DL (ref 3.5–5.2)
ALP SERPL-CCNC: 83 U/L (ref 40–150)
ALT SERPL W P-5'-P-CCNC: 23 U/L (ref 0–70)
ANION GAP SERPL CALCULATED.3IONS-SCNC: 8 MMOL/L (ref 7–15)
AST SERPL W P-5'-P-CCNC: 24 U/L (ref 0–45)
BILIRUB SERPL-MCNC: 0.4 MG/DL
BUN SERPL-MCNC: 14.1 MG/DL (ref 8–23)
CALCIUM SERPL-MCNC: 9.3 MG/DL (ref 8.8–10.4)
CHLORIDE SERPL-SCNC: 103 MMOL/L (ref 98–107)
CHOLEST SERPL-MCNC: 228 MG/DL
CREAT SERPL-MCNC: 1.01 MG/DL (ref 0.67–1.17)
EGFRCR SERPLBLD CKD-EPI 2021: 84 ML/MIN/1.73M2
ERYTHROCYTE [DISTWIDTH] IN BLOOD BY AUTOMATED COUNT: 12.7 % (ref 10–15)
EST. AVERAGE GLUCOSE BLD GHB EST-MCNC: 108 MG/DL
FASTING STATUS PATIENT QL REPORTED: YES
FASTING STATUS PATIENT QL REPORTED: YES
GLUCOSE SERPL-MCNC: 100 MG/DL (ref 70–99)
HBA1C MFR BLD: 5.4 % (ref 0–5.6)
HCO3 SERPL-SCNC: 29 MMOL/L (ref 22–29)
HCT VFR BLD AUTO: 48.3 % (ref 40–53)
HDLC SERPL-MCNC: 44 MG/DL
HGB BLD-MCNC: 16.3 G/DL (ref 13.3–17.7)
LDLC SERPL CALC-MCNC: 150 MG/DL
MCH RBC QN AUTO: 30.8 PG (ref 26.5–33)
MCHC RBC AUTO-ENTMCNC: 33.7 G/DL (ref 31.5–36.5)
MCV RBC AUTO: 91 FL (ref 78–100)
NONHDLC SERPL-MCNC: 184 MG/DL
PLATELET # BLD AUTO: 222 10E3/UL (ref 150–450)
POTASSIUM SERPL-SCNC: 4.5 MMOL/L (ref 3.4–5.3)
PROT SERPL-MCNC: 7.1 G/DL (ref 6.4–8.3)
PSA SERPL DL<=0.01 NG/ML-MCNC: 3.76 NG/ML (ref 0–4.5)
RBC # BLD AUTO: 5.3 10E6/UL (ref 4.4–5.9)
SODIUM SERPL-SCNC: 140 MMOL/L (ref 135–145)
TRIGL SERPL-MCNC: 171 MG/DL
TSH SERPL DL<=0.005 MIU/L-ACNC: 2.18 UIU/ML (ref 0.3–4.2)
WBC # BLD AUTO: 6.1 10E3/UL (ref 4–11)

## 2025-06-25 PROCEDURE — 3074F SYST BP LT 130 MM HG: CPT | Performed by: INTERNAL MEDICINE

## 2025-06-25 PROCEDURE — 85027 COMPLETE CBC AUTOMATED: CPT | Performed by: INTERNAL MEDICINE

## 2025-06-25 PROCEDURE — 83036 HEMOGLOBIN GLYCOSYLATED A1C: CPT | Performed by: INTERNAL MEDICINE

## 2025-06-25 PROCEDURE — G0103 PSA SCREENING: HCPCS | Performed by: INTERNAL MEDICINE

## 2025-06-25 PROCEDURE — 99396 PREV VISIT EST AGE 40-64: CPT | Performed by: INTERNAL MEDICINE

## 2025-06-25 PROCEDURE — 80061 LIPID PANEL: CPT | Performed by: INTERNAL MEDICINE

## 2025-06-25 PROCEDURE — 3044F HG A1C LEVEL LT 7.0%: CPT | Performed by: INTERNAL MEDICINE

## 2025-06-25 PROCEDURE — 3078F DIAST BP <80 MM HG: CPT | Performed by: INTERNAL MEDICINE

## 2025-06-25 PROCEDURE — 36415 COLL VENOUS BLD VENIPUNCTURE: CPT | Performed by: INTERNAL MEDICINE

## 2025-06-25 PROCEDURE — 80053 COMPREHEN METABOLIC PANEL: CPT | Performed by: INTERNAL MEDICINE

## 2025-06-25 PROCEDURE — 84443 ASSAY THYROID STIM HORMONE: CPT | Performed by: INTERNAL MEDICINE

## 2025-06-25 RX ORDER — PRAVASTATIN SODIUM 20 MG
20 TABLET ORAL EVERY OTHER DAY
Qty: 45 TABLET | Refills: 11 | Status: SHIPPED | OUTPATIENT
Start: 2025-06-25

## 2025-06-25 SDOH — HEALTH STABILITY: PHYSICAL HEALTH: ON AVERAGE, HOW MANY DAYS PER WEEK DO YOU ENGAGE IN MODERATE TO STRENUOUS EXERCISE (LIKE A BRISK WALK)?: 5 DAYS

## 2025-06-25 ASSESSMENT — SOCIAL DETERMINANTS OF HEALTH (SDOH): HOW OFTEN DO YOU GET TOGETHER WITH FRIENDS OR RELATIVES?: THREE TIMES A WEEK
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
## 2025-06-26 ENCOUNTER — RESULTS FOLLOW-UP (OUTPATIENT)
Dept: INTERNAL MEDICINE | Facility: CLINIC | Age: 65
End: 2025-06-26

## 2025-06-26 DIAGNOSIS — I25.10 CORONARY ARTERY CALCIFICATION SEEN ON CT SCAN: Primary | ICD-10-CM

## 2025-06-26 DIAGNOSIS — E78.2 MIXED HYPERLIPIDEMIA: ICD-10-CM

## 2025-06-26 RX ORDER — EZETIMIBE 10 MG/1
10 TABLET ORAL DAILY
Qty: 90 TABLET | Refills: 3 | Status: SHIPPED | OUTPATIENT
Start: 2025-06-26